# Patient Record
Sex: MALE | Race: WHITE
[De-identification: names, ages, dates, MRNs, and addresses within clinical notes are randomized per-mention and may not be internally consistent; named-entity substitution may affect disease eponyms.]

---

## 2017-08-15 ENCOUNTER — HOSPITAL ENCOUNTER (EMERGENCY)
Dept: HOSPITAL 8 - ED | Age: 52
Discharge: HOME | End: 2017-08-15
Payer: MEDICARE

## 2017-08-15 VITALS — DIASTOLIC BLOOD PRESSURE: 65 MMHG | SYSTOLIC BLOOD PRESSURE: 104 MMHG

## 2017-08-15 VITALS — HEIGHT: 69 IN | WEIGHT: 186.95 LBS | BODY MASS INDEX: 27.69 KG/M2

## 2017-08-15 DIAGNOSIS — K08.89: Primary | ICD-10-CM

## 2017-08-15 PROCEDURE — 99283 EMERGENCY DEPT VISIT LOW MDM: CPT

## 2017-10-10 ENCOUNTER — HOSPITAL ENCOUNTER (EMERGENCY)
Dept: HOSPITAL 8 - ED | Age: 52
Discharge: HOME | End: 2017-10-10
Payer: MEDICARE

## 2017-10-10 VITALS — WEIGHT: 200.62 LBS | HEIGHT: 69 IN | BODY MASS INDEX: 29.71 KG/M2

## 2017-10-10 VITALS — DIASTOLIC BLOOD PRESSURE: 57 MMHG | SYSTOLIC BLOOD PRESSURE: 107 MMHG

## 2017-10-10 DIAGNOSIS — G89.29: ICD-10-CM

## 2017-10-10 DIAGNOSIS — M54.9: ICD-10-CM

## 2017-10-10 DIAGNOSIS — Z90.89: ICD-10-CM

## 2017-10-10 DIAGNOSIS — Z98.890: ICD-10-CM

## 2017-10-10 DIAGNOSIS — K02.9: Primary | ICD-10-CM

## 2017-10-10 PROCEDURE — 99283 EMERGENCY DEPT VISIT LOW MDM: CPT

## 2018-06-20 ENCOUNTER — HOSPITAL ENCOUNTER (EMERGENCY)
Dept: HOSPITAL 8 - ED | Age: 53
Discharge: HOME | End: 2018-06-20
Payer: MEDICARE

## 2018-06-20 VITALS — SYSTOLIC BLOOD PRESSURE: 117 MMHG | DIASTOLIC BLOOD PRESSURE: 75 MMHG

## 2018-06-20 VITALS — BODY MASS INDEX: 31.64 KG/M2 | HEIGHT: 69 IN | WEIGHT: 213.63 LBS

## 2018-06-20 DIAGNOSIS — K02.9: Primary | ICD-10-CM

## 2018-06-20 DIAGNOSIS — G89.29: ICD-10-CM

## 2018-06-20 DIAGNOSIS — F17.210: ICD-10-CM

## 2018-06-20 PROCEDURE — 99283 EMERGENCY DEPT VISIT LOW MDM: CPT

## 2018-09-28 ENCOUNTER — HOSPITAL ENCOUNTER (OUTPATIENT)
Dept: LAB | Facility: MEDICAL CENTER | Age: 53
End: 2018-09-28
Attending: FAMILY MEDICINE
Payer: MEDICARE

## 2018-09-28 LAB
ALBUMIN SERPL BCP-MCNC: 4.2 G/DL (ref 3.2–4.9)
ALBUMIN/GLOB SERPL: 1.5 G/DL
ALP SERPL-CCNC: 39 U/L (ref 30–99)
ALT SERPL-CCNC: 43 U/L (ref 2–50)
ANION GAP SERPL CALC-SCNC: 5 MMOL/L (ref 0–11.9)
AST SERPL-CCNC: 42 U/L (ref 12–45)
BILIRUB SERPL-MCNC: 0.5 MG/DL (ref 0.1–1.5)
BUN SERPL-MCNC: 17 MG/DL (ref 8–22)
CALCIUM SERPL-MCNC: 9.1 MG/DL (ref 8.5–10.5)
CHLORIDE SERPL-SCNC: 105 MMOL/L (ref 96–112)
CHOLEST SERPL-MCNC: 135 MG/DL (ref 100–199)
CO2 SERPL-SCNC: 28 MMOL/L (ref 20–33)
CREAT SERPL-MCNC: 1 MG/DL (ref 0.5–1.4)
ERYTHROCYTE [DISTWIDTH] IN BLOOD BY AUTOMATED COUNT: 50.2 FL (ref 35.9–50)
FASTING STATUS PATIENT QL REPORTED: NORMAL
GLOBULIN SER CALC-MCNC: 2.8 G/DL (ref 1.9–3.5)
GLUCOSE SERPL-MCNC: 80 MG/DL (ref 65–99)
HCT VFR BLD AUTO: 45 % (ref 42–52)
HDLC SERPL-MCNC: 54 MG/DL
HGB BLD-MCNC: 15.4 G/DL (ref 14–18)
LDLC SERPL CALC-MCNC: 63 MG/DL
MCH RBC QN AUTO: 32.4 PG (ref 27–33)
MCHC RBC AUTO-ENTMCNC: 34.2 G/DL (ref 33.7–35.3)
MCV RBC AUTO: 94.5 FL (ref 81.4–97.8)
PLATELET # BLD AUTO: 197 K/UL (ref 164–446)
PMV BLD AUTO: 11.4 FL (ref 9–12.9)
POTASSIUM SERPL-SCNC: 4.4 MMOL/L (ref 3.6–5.5)
PROT SERPL-MCNC: 7 G/DL (ref 6–8.2)
PSA SERPL-MCNC: 0.65 NG/ML (ref 0–4)
RBC # BLD AUTO: 4.76 M/UL (ref 4.7–6.1)
SODIUM SERPL-SCNC: 138 MMOL/L (ref 135–145)
TRIGL SERPL-MCNC: 89 MG/DL (ref 0–149)
WBC # BLD AUTO: 7 K/UL (ref 4.8–10.8)

## 2018-09-28 PROCEDURE — 80053 COMPREHEN METABOLIC PANEL: CPT

## 2018-09-28 PROCEDURE — 36415 COLL VENOUS BLD VENIPUNCTURE: CPT

## 2018-09-28 PROCEDURE — 80061 LIPID PANEL: CPT

## 2018-09-28 PROCEDURE — 84153 ASSAY OF PSA TOTAL: CPT | Mod: GA

## 2018-09-28 PROCEDURE — 85027 COMPLETE CBC AUTOMATED: CPT

## 2018-11-28 ENCOUNTER — HOSPITAL ENCOUNTER (EMERGENCY)
Dept: HOSPITAL 8 - ED | Age: 53
Discharge: HOME | End: 2018-11-28
Payer: MEDICARE

## 2018-11-28 VITALS — DIASTOLIC BLOOD PRESSURE: 71 MMHG | SYSTOLIC BLOOD PRESSURE: 123 MMHG

## 2018-11-28 VITALS — HEIGHT: 69 IN | WEIGHT: 182.32 LBS | BODY MASS INDEX: 27 KG/M2

## 2018-11-28 DIAGNOSIS — L03.011: ICD-10-CM

## 2018-11-28 DIAGNOSIS — Z90.89: ICD-10-CM

## 2018-11-28 DIAGNOSIS — J20.9: Primary | ICD-10-CM

## 2018-11-28 DIAGNOSIS — F17.200: ICD-10-CM

## 2018-11-28 PROCEDURE — 90471 IMMUNIZATION ADMIN: CPT

## 2018-11-28 PROCEDURE — 71046 X-RAY EXAM CHEST 2 VIEWS: CPT

## 2018-11-28 PROCEDURE — 99283 EMERGENCY DEPT VISIT LOW MDM: CPT

## 2018-11-28 PROCEDURE — 90715 TDAP VACCINE 7 YRS/> IM: CPT

## 2019-06-05 ENCOUNTER — HOSPITAL ENCOUNTER (EMERGENCY)
Dept: HOSPITAL 8 - ED | Age: 54
Discharge: HOME | End: 2019-06-05
Payer: MEDICARE

## 2019-06-05 VITALS — DIASTOLIC BLOOD PRESSURE: 80 MMHG | SYSTOLIC BLOOD PRESSURE: 129 MMHG

## 2019-06-05 VITALS — HEIGHT: 69 IN | BODY MASS INDEX: 27.98 KG/M2 | WEIGHT: 188.94 LBS

## 2019-06-05 DIAGNOSIS — K02.9: ICD-10-CM

## 2019-06-05 DIAGNOSIS — K04.7: Primary | ICD-10-CM

## 2019-06-05 PROCEDURE — 99283 EMERGENCY DEPT VISIT LOW MDM: CPT

## 2019-11-20 ENCOUNTER — APPOINTMENT (OUTPATIENT)
Dept: RADIOLOGY | Facility: MEDICAL CENTER | Age: 54
DRG: 381 | End: 2019-11-20
Attending: EMERGENCY MEDICINE
Payer: MEDICARE

## 2019-11-20 ENCOUNTER — HOSPITAL ENCOUNTER (INPATIENT)
Facility: MEDICAL CENTER | Age: 54
LOS: 2 days | DRG: 381 | End: 2019-11-22
Attending: EMERGENCY MEDICINE | Admitting: FAMILY MEDICINE
Payer: MEDICARE

## 2019-11-20 DIAGNOSIS — R11.2 NAUSEA AND VOMITING, INTRACTABILITY OF VOMITING NOT SPECIFIED, UNSPECIFIED VOMITING TYPE: ICD-10-CM

## 2019-11-20 DIAGNOSIS — M54.6 CHRONIC THORACIC BACK PAIN, UNSPECIFIED BACK PAIN LATERALITY: ICD-10-CM

## 2019-11-20 DIAGNOSIS — G89.29 CHRONIC THORACIC BACK PAIN, UNSPECIFIED BACK PAIN LATERALITY: ICD-10-CM

## 2019-11-20 DIAGNOSIS — K92.2 UPPER GI BLEED: ICD-10-CM

## 2019-11-20 LAB
ALBUMIN SERPL BCP-MCNC: 4.6 G/DL (ref 3.2–4.9)
ALBUMIN/GLOB SERPL: 1.8 G/DL
ALP SERPL-CCNC: 61 U/L (ref 30–99)
ALT SERPL-CCNC: 81 U/L (ref 2–50)
ANION GAP SERPL CALC-SCNC: 10 MMOL/L (ref 0–11.9)
APPEARANCE UR: CLEAR
APTT PPP: 30 SEC (ref 24.7–36)
AST SERPL-CCNC: 167 U/L (ref 12–45)
BASOPHILS # BLD AUTO: 0.4 % (ref 0–1.8)
BASOPHILS # BLD: 0.05 K/UL (ref 0–0.12)
BILIRUB SERPL-MCNC: 1 MG/DL (ref 0.1–1.5)
BILIRUB UR QL STRIP.AUTO: NEGATIVE
BUN SERPL-MCNC: 41 MG/DL (ref 8–22)
CALCIUM SERPL-MCNC: 9.2 MG/DL (ref 8.5–10.5)
CHLORIDE SERPL-SCNC: 96 MMOL/L (ref 96–112)
CO2 SERPL-SCNC: 29 MMOL/L (ref 20–33)
COLOR UR: YELLOW
CREAT SERPL-MCNC: 1.08 MG/DL (ref 0.5–1.4)
EOSINOPHIL # BLD AUTO: 0.09 K/UL (ref 0–0.51)
EOSINOPHIL NFR BLD: 0.7 % (ref 0–6.9)
ERYTHROCYTE [DISTWIDTH] IN BLOOD BY AUTOMATED COUNT: 52.2 FL (ref 35.9–50)
GLOBULIN SER CALC-MCNC: 2.6 G/DL (ref 1.9–3.5)
GLUCOSE SERPL-MCNC: 117 MG/DL (ref 65–99)
GLUCOSE UR STRIP.AUTO-MCNC: NEGATIVE MG/DL
HCT VFR BLD AUTO: 40.4 % (ref 42–52)
HGB BLD-MCNC: 13.7 G/DL (ref 14–18)
IMM GRANULOCYTES # BLD AUTO: 0.04 K/UL (ref 0–0.11)
IMM GRANULOCYTES NFR BLD AUTO: 0.3 % (ref 0–0.9)
INR PPP: 0.97 (ref 0.87–1.13)
KETONES UR STRIP.AUTO-MCNC: 15 MG/DL
LACTATE BLD-SCNC: 1.9 MMOL/L (ref 0.5–2)
LEUKOCYTE ESTERASE UR QL STRIP.AUTO: NEGATIVE
LYMPHOCYTES # BLD AUTO: 1.54 K/UL (ref 1–4.8)
LYMPHOCYTES NFR BLD: 12.4 % (ref 22–41)
MCH RBC QN AUTO: 32.4 PG (ref 27–33)
MCHC RBC AUTO-ENTMCNC: 33.9 G/DL (ref 33.7–35.3)
MCV RBC AUTO: 95.5 FL (ref 81.4–97.8)
MICRO URNS: ABNORMAL
MONOCYTES # BLD AUTO: 0.95 K/UL (ref 0–0.85)
MONOCYTES NFR BLD AUTO: 7.7 % (ref 0–13.4)
NEUTROPHILS # BLD AUTO: 9.7 K/UL (ref 1.82–7.42)
NEUTROPHILS NFR BLD: 78.5 % (ref 44–72)
NITRITE UR QL STRIP.AUTO: NEGATIVE
NRBC # BLD AUTO: 0 K/UL
NRBC BLD-RTO: 0 /100 WBC
PH UR STRIP.AUTO: 5.5 [PH] (ref 5–8)
PLATELET # BLD AUTO: 231 K/UL (ref 164–446)
PMV BLD AUTO: 11 FL (ref 9–12.9)
POTASSIUM SERPL-SCNC: 3.9 MMOL/L (ref 3.6–5.5)
PROT SERPL-MCNC: 7.2 G/DL (ref 6–8.2)
PROT UR QL STRIP: NEGATIVE MG/DL
PROTHROMBIN TIME: 13.1 SEC (ref 12–14.6)
RBC # BLD AUTO: 4.23 M/UL (ref 4.7–6.1)
RBC UR QL AUTO: NEGATIVE
SODIUM SERPL-SCNC: 135 MMOL/L (ref 135–145)
SP GR UR STRIP.AUTO: 1.02
UROBILINOGEN UR STRIP.AUTO-MCNC: 0.2 MG/DL
WBC # BLD AUTO: 12.4 K/UL (ref 4.8–10.8)

## 2019-11-20 PROCEDURE — 81003 URINALYSIS AUTO W/O SCOPE: CPT

## 2019-11-20 PROCEDURE — 85730 THROMBOPLASTIN TIME PARTIAL: CPT

## 2019-11-20 PROCEDURE — 85610 PROTHROMBIN TIME: CPT

## 2019-11-20 PROCEDURE — 96376 TX/PRO/DX INJ SAME DRUG ADON: CPT

## 2019-11-20 PROCEDURE — 96374 THER/PROPH/DIAG INJ IV PUSH: CPT

## 2019-11-20 PROCEDURE — 96375 TX/PRO/DX INJ NEW DRUG ADDON: CPT

## 2019-11-20 PROCEDURE — C9113 INJ PANTOPRAZOLE SODIUM, VIA: HCPCS | Performed by: EMERGENCY MEDICINE

## 2019-11-20 PROCEDURE — 85025 COMPLETE CBC W/AUTO DIFF WBC: CPT

## 2019-11-20 PROCEDURE — 770006 HCHG ROOM/CARE - MED/SURG/GYN SEMI*

## 2019-11-20 PROCEDURE — 80053 COMPREHEN METABOLIC PANEL: CPT

## 2019-11-20 PROCEDURE — 87086 URINE CULTURE/COLONY COUNT: CPT

## 2019-11-20 PROCEDURE — 700111 HCHG RX REV CODE 636 W/ 250 OVERRIDE (IP): Performed by: EMERGENCY MEDICINE

## 2019-11-20 PROCEDURE — 83605 ASSAY OF LACTIC ACID: CPT

## 2019-11-20 PROCEDURE — 71045 X-RAY EXAM CHEST 1 VIEW: CPT

## 2019-11-20 PROCEDURE — 87040 BLOOD CULTURE FOR BACTERIA: CPT | Mod: 91

## 2019-11-20 PROCEDURE — 700105 HCHG RX REV CODE 258: Performed by: EMERGENCY MEDICINE

## 2019-11-20 PROCEDURE — 99285 EMERGENCY DEPT VISIT HI MDM: CPT

## 2019-11-20 RX ORDER — BISACODYL 10 MG
10 SUPPOSITORY, RECTAL RECTAL
Status: DISCONTINUED | OUTPATIENT
Start: 2019-11-20 | End: 2019-11-22 | Stop reason: HOSPADM

## 2019-11-20 RX ORDER — ONDANSETRON 2 MG/ML
4 INJECTION INTRAMUSCULAR; INTRAVENOUS EVERY 6 HOURS PRN
Status: DISCONTINUED | OUTPATIENT
Start: 2019-11-20 | End: 2019-11-22 | Stop reason: HOSPADM

## 2019-11-20 RX ORDER — GABAPENTIN 100 MG/1
100 CAPSULE ORAL 2 TIMES DAILY
COMMUNITY
End: 2022-05-09

## 2019-11-20 RX ORDER — NICOTINE 21 MG/24HR
14 PATCH, TRANSDERMAL 24 HOURS TRANSDERMAL
Status: DISCONTINUED | OUTPATIENT
Start: 2019-11-21 | End: 2019-11-22 | Stop reason: HOSPADM

## 2019-11-20 RX ORDER — SODIUM CHLORIDE 9 MG/ML
INJECTION, SOLUTION INTRAVENOUS CONTINUOUS
Status: DISCONTINUED | OUTPATIENT
Start: 2019-11-20 | End: 2019-11-22

## 2019-11-20 RX ORDER — GABAPENTIN 100 MG/1
100 CAPSULE ORAL 2 TIMES DAILY
Status: DISCONTINUED | OUTPATIENT
Start: 2019-11-20 | End: 2019-11-22 | Stop reason: HOSPADM

## 2019-11-20 RX ORDER — AMOXICILLIN 250 MG
2 CAPSULE ORAL 2 TIMES DAILY
Status: DISCONTINUED | OUTPATIENT
Start: 2019-11-21 | End: 2019-11-22 | Stop reason: HOSPADM

## 2019-11-20 RX ORDER — ONDANSETRON 2 MG/ML
4 INJECTION INTRAMUSCULAR; INTRAVENOUS ONCE
Status: COMPLETED | OUTPATIENT
Start: 2019-11-20 | End: 2019-11-20

## 2019-11-20 RX ORDER — TAMSULOSIN HYDROCHLORIDE 0.4 MG/1
0.4 CAPSULE ORAL DAILY
Status: DISCONTINUED | OUTPATIENT
Start: 2019-11-21 | End: 2019-11-22 | Stop reason: HOSPADM

## 2019-11-20 RX ORDER — MORPHINE SULFATE 4 MG/ML
4 INJECTION, SOLUTION INTRAMUSCULAR; INTRAVENOUS ONCE
Status: COMPLETED | OUTPATIENT
Start: 2019-11-20 | End: 2019-11-20

## 2019-11-20 RX ORDER — PANTOPRAZOLE SODIUM 40 MG/1
40 TABLET, DELAYED RELEASE ORAL DAILY
Status: DISCONTINUED | OUTPATIENT
Start: 2019-11-21 | End: 2019-11-20

## 2019-11-20 RX ORDER — PANTOPRAZOLE SODIUM 40 MG/1
40 TABLET, DELAYED RELEASE ORAL DAILY
Refills: 0 | COMMUNITY
Start: 2019-11-02 | End: 2022-05-09

## 2019-11-20 RX ORDER — POLYETHYLENE GLYCOL 3350 17 G/17G
1 POWDER, FOR SOLUTION ORAL
Status: DISCONTINUED | OUTPATIENT
Start: 2019-11-20 | End: 2019-11-22 | Stop reason: HOSPADM

## 2019-11-20 RX ORDER — PANTOPRAZOLE SODIUM 40 MG/10ML
40 INJECTION, POWDER, LYOPHILIZED, FOR SOLUTION INTRAVENOUS 2 TIMES DAILY
Status: DISCONTINUED | OUTPATIENT
Start: 2019-11-21 | End: 2019-11-22 | Stop reason: HOSPADM

## 2019-11-20 RX ADMIN — ONDANSETRON 4 MG: 2 INJECTION INTRAMUSCULAR; INTRAVENOUS at 23:31

## 2019-11-20 RX ADMIN — MORPHINE SULFATE 4 MG: 4 INJECTION INTRAVENOUS at 20:10

## 2019-11-20 RX ADMIN — MORPHINE SULFATE 4 MG: 4 INJECTION INTRAVENOUS at 23:31

## 2019-11-20 RX ADMIN — ONDANSETRON 4 MG: 2 INJECTION INTRAMUSCULAR; INTRAVENOUS at 20:10

## 2019-11-20 RX ADMIN — SODIUM CHLORIDE 80 MG: 9 INJECTION, SOLUTION INTRAVENOUS at 20:39

## 2019-11-21 ENCOUNTER — ANESTHESIA (OUTPATIENT)
Dept: SURGERY | Facility: MEDICAL CENTER | Age: 54
DRG: 381 | End: 2019-11-21
Payer: MEDICARE

## 2019-11-21 ENCOUNTER — ANESTHESIA EVENT (OUTPATIENT)
Dept: SURGERY | Facility: MEDICAL CENTER | Age: 54
DRG: 381 | End: 2019-11-21
Payer: MEDICARE

## 2019-11-21 ENCOUNTER — APPOINTMENT (OUTPATIENT)
Dept: RADIOLOGY | Facility: MEDICAL CENTER | Age: 54
DRG: 381 | End: 2019-11-21
Attending: STUDENT IN AN ORGANIZED HEALTH CARE EDUCATION/TRAINING PROGRAM
Payer: MEDICARE

## 2019-11-21 LAB
ALBUMIN SERPL BCP-MCNC: 3.9 G/DL (ref 3.2–4.9)
ALBUMIN/GLOB SERPL: 1.5 G/DL
ALP SERPL-CCNC: 51 U/L (ref 30–99)
ALT SERPL-CCNC: 72 U/L (ref 2–50)
ANION GAP SERPL CALC-SCNC: 8 MMOL/L (ref 0–11.9)
AST SERPL-CCNC: 148 U/L (ref 12–45)
BILIRUB SERPL-MCNC: 1 MG/DL (ref 0.1–1.5)
BUN SERPL-MCNC: 35 MG/DL (ref 8–22)
CALCIUM SERPL-MCNC: 8.3 MG/DL (ref 8.5–10.5)
CHLORIDE SERPL-SCNC: 101 MMOL/L (ref 96–112)
CO2 SERPL-SCNC: 26 MMOL/L (ref 20–33)
CREAT SERPL-MCNC: 0.86 MG/DL (ref 0.5–1.4)
GLOBULIN SER CALC-MCNC: 2.6 G/DL (ref 1.9–3.5)
GLUCOSE SERPL-MCNC: 97 MG/DL (ref 65–99)
HAV IGM SERPL QL IA: NEGATIVE
HBV CORE IGM SER QL: NEGATIVE
HBV SURFACE AG SER QL: NEGATIVE
HCT VFR BLD AUTO: 39.2 % (ref 42–52)
HCV AB SER QL: NEGATIVE
HGB BLD-MCNC: 13 G/DL (ref 14–18)
LIPASE SERPL-CCNC: 10 U/L (ref 11–82)
POTASSIUM SERPL-SCNC: 3.8 MMOL/L (ref 3.6–5.5)
PROT SERPL-MCNC: 6.5 G/DL (ref 6–8.2)
SODIUM SERPL-SCNC: 135 MMOL/L (ref 135–145)

## 2019-11-21 PROCEDURE — 500066 HCHG BITE BLOCK, ECT: Performed by: INTERNAL MEDICINE

## 2019-11-21 PROCEDURE — A9270 NON-COVERED ITEM OR SERVICE: HCPCS | Performed by: STUDENT IN AN ORGANIZED HEALTH CARE EDUCATION/TRAINING PROGRAM

## 2019-11-21 PROCEDURE — 700102 HCHG RX REV CODE 250 W/ 637 OVERRIDE(OP): Performed by: STUDENT IN AN ORGANIZED HEALTH CARE EDUCATION/TRAINING PROGRAM

## 2019-11-21 PROCEDURE — 160035 HCHG PACU - 1ST 60 MINS PHASE I: Performed by: INTERNAL MEDICINE

## 2019-11-21 PROCEDURE — 80074 ACUTE HEPATITIS PANEL: CPT

## 2019-11-21 PROCEDURE — 160036 HCHG PACU - EA ADDL 30 MINS PHASE I: Performed by: INTERNAL MEDICINE

## 2019-11-21 PROCEDURE — 85018 HEMOGLOBIN: CPT

## 2019-11-21 PROCEDURE — 76700 US EXAM ABDOM COMPLETE: CPT

## 2019-11-21 PROCEDURE — 0DJ68ZZ INSPECTION OF STOMACH, VIA NATURAL OR ARTIFICIAL OPENING ENDOSCOPIC: ICD-10-PCS | Performed by: INTERNAL MEDICINE

## 2019-11-21 PROCEDURE — C9113 INJ PANTOPRAZOLE SODIUM, VIA: HCPCS | Performed by: STUDENT IN AN ORGANIZED HEALTH CARE EDUCATION/TRAINING PROGRAM

## 2019-11-21 PROCEDURE — 700111 HCHG RX REV CODE 636 W/ 250 OVERRIDE (IP): Performed by: ANESTHESIOLOGY

## 2019-11-21 PROCEDURE — 770006 HCHG ROOM/CARE - MED/SURG/GYN SEMI*

## 2019-11-21 PROCEDURE — 80053 COMPREHEN METABOLIC PANEL: CPT

## 2019-11-21 PROCEDURE — 36415 COLL VENOUS BLD VENIPUNCTURE: CPT

## 2019-11-21 PROCEDURE — 700111 HCHG RX REV CODE 636 W/ 250 OVERRIDE (IP): Performed by: STUDENT IN AN ORGANIZED HEALTH CARE EDUCATION/TRAINING PROGRAM

## 2019-11-21 PROCEDURE — 83690 ASSAY OF LIPASE: CPT

## 2019-11-21 PROCEDURE — 160009 HCHG ANES TIME/MIN: Performed by: INTERNAL MEDICINE

## 2019-11-21 PROCEDURE — 160048 HCHG OR STATISTICAL LEVEL 1-5: Performed by: INTERNAL MEDICINE

## 2019-11-21 PROCEDURE — 160203 HCHG ENDO MINUTES - 1ST 30 MINS LEVEL 4: Performed by: INTERNAL MEDICINE

## 2019-11-21 PROCEDURE — 700105 HCHG RX REV CODE 258: Performed by: STUDENT IN AN ORGANIZED HEALTH CARE EDUCATION/TRAINING PROGRAM

## 2019-11-21 PROCEDURE — 0DJ08ZZ INSPECTION OF UPPER INTESTINAL TRACT, VIA NATURAL OR ARTIFICIAL OPENING ENDOSCOPIC: ICD-10-PCS | Performed by: INTERNAL MEDICINE

## 2019-11-21 PROCEDURE — 85014 HEMATOCRIT: CPT

## 2019-11-21 PROCEDURE — 700105 HCHG RX REV CODE 258: Performed by: ANESTHESIOLOGY

## 2019-11-21 PROCEDURE — 160002 HCHG RECOVERY MINUTES (STAT): Performed by: INTERNAL MEDICINE

## 2019-11-21 RX ORDER — SODIUM CHLORIDE, SODIUM LACTATE, POTASSIUM CHLORIDE, CALCIUM CHLORIDE 600; 310; 30; 20 MG/100ML; MG/100ML; MG/100ML; MG/100ML
INJECTION, SOLUTION INTRAVENOUS
Status: DISCONTINUED | OUTPATIENT
Start: 2019-11-21 | End: 2019-11-21 | Stop reason: SURG

## 2019-11-21 RX ORDER — SODIUM CHLORIDE, SODIUM LACTATE, POTASSIUM CHLORIDE, CALCIUM CHLORIDE 600; 310; 30; 20 MG/100ML; MG/100ML; MG/100ML; MG/100ML
INJECTION, SOLUTION INTRAVENOUS CONTINUOUS
Status: DISCONTINUED | OUTPATIENT
Start: 2019-11-21 | End: 2019-11-21 | Stop reason: HOSPADM

## 2019-11-21 RX ORDER — MIDAZOLAM HYDROCHLORIDE 1 MG/ML
INJECTION INTRAMUSCULAR; INTRAVENOUS PRN
Status: DISCONTINUED | OUTPATIENT
Start: 2019-11-21 | End: 2019-11-21 | Stop reason: SURG

## 2019-11-21 RX ORDER — TRAMADOL HYDROCHLORIDE 50 MG/1
100 TABLET ORAL 2 TIMES DAILY PRN
Status: DISCONTINUED | OUTPATIENT
Start: 2019-11-21 | End: 2019-11-22 | Stop reason: HOSPADM

## 2019-11-21 RX ORDER — MORPHINE SULFATE 4 MG/ML
4 INJECTION, SOLUTION INTRAMUSCULAR; INTRAVENOUS ONCE
Status: COMPLETED | OUTPATIENT
Start: 2019-11-21 | End: 2019-11-21

## 2019-11-21 RX ORDER — ONDANSETRON 2 MG/ML
4 INJECTION INTRAMUSCULAR; INTRAVENOUS
Status: DISCONTINUED | OUTPATIENT
Start: 2019-11-21 | End: 2019-11-21 | Stop reason: HOSPADM

## 2019-11-21 RX ORDER — ACETAMINOPHEN 500 MG
1000 TABLET ORAL 3 TIMES DAILY PRN
Status: DISCONTINUED | OUTPATIENT
Start: 2019-11-21 | End: 2019-11-21

## 2019-11-21 RX ADMIN — GABAPENTIN 100 MG: 100 CAPSULE ORAL at 16:48

## 2019-11-21 RX ADMIN — TRAMADOL HYDROCHLORIDE 100 MG: 50 TABLET ORAL at 08:11

## 2019-11-21 RX ADMIN — TAMSULOSIN HYDROCHLORIDE 0.4 MG: 0.4 CAPSULE ORAL at 04:40

## 2019-11-21 RX ADMIN — PANTOPRAZOLE SODIUM 40 MG: 40 INJECTION, POWDER, LYOPHILIZED, FOR SOLUTION INTRAVENOUS at 04:49

## 2019-11-21 RX ADMIN — GABAPENTIN 100 MG: 100 CAPSULE ORAL at 04:40

## 2019-11-21 RX ADMIN — PROPOFOL 40 MG: 10 INJECTION, EMULSION INTRAVENOUS at 13:46

## 2019-11-21 RX ADMIN — NICOTINE POLACRILEX 2 MG: 2 GUM, CHEWING BUCCAL at 01:11

## 2019-11-21 RX ADMIN — NICOTINE 14 MG: 14 PATCH TRANSDERMAL at 04:40

## 2019-11-21 RX ADMIN — PANTOPRAZOLE SODIUM 40 MG: 40 INJECTION, POWDER, LYOPHILIZED, FOR SOLUTION INTRAVENOUS at 16:49

## 2019-11-21 RX ADMIN — SODIUM CHLORIDE, POTASSIUM CHLORIDE, SODIUM LACTATE AND CALCIUM CHLORIDE: 600; 310; 30; 20 INJECTION, SOLUTION INTRAVENOUS at 13:35

## 2019-11-21 RX ADMIN — MORPHINE SULFATE 4 MG: 4 INJECTION INTRAVENOUS at 01:33

## 2019-11-21 RX ADMIN — SODIUM CHLORIDE: 9 INJECTION, SOLUTION INTRAVENOUS at 00:20

## 2019-11-21 RX ADMIN — MIDAZOLAM 2 MG: 1 INJECTION INTRAMUSCULAR; INTRAVENOUS at 13:35

## 2019-11-21 RX ADMIN — PROPOFOL 100 MG: 10 INJECTION, EMULSION INTRAVENOUS at 13:38

## 2019-11-21 RX ADMIN — PROPOFOL 20 MG: 10 INJECTION, EMULSION INTRAVENOUS at 13:42

## 2019-11-21 ASSESSMENT — PATIENT HEALTH QUESTIONNAIRE - PHQ9
SUM OF ALL RESPONSES TO PHQ QUESTIONS 1-9: 4
3. TROUBLE FALLING OR STAYING ASLEEP OR SLEEPING TOO MUCH: SEVERAL DAYS
1. LITTLE INTEREST OR PLEASURE IN DOING THINGS: SEVERAL DAYS
7. TROUBLE CONCENTRATING ON THINGS, SUCH AS READING THE NEWSPAPER OR WATCHING TELEVISION: NOT AT ALL
4. FEELING TIRED OR HAVING LITTLE ENERGY: SEVERAL DAYS
5. POOR APPETITE OR OVEREATING: NOT AT ALL
2. FEELING DOWN, DEPRESSED, IRRITABLE, OR HOPELESS: SEVERAL DAYS
6. FEELING BAD ABOUT YOURSELF - OR THAT YOU ARE A FAILURE OR HAVE LET YOURSELF OR YOUR FAMILY DOWN: NOT AL ALL
9. THOUGHTS THAT YOU WOULD BE BETTER OFF DEAD, OR OF HURTING YOURSELF: NOT AT ALL
SUM OF ALL RESPONSES TO PHQ9 QUESTIONS 1 AND 2: 2
8. MOVING OR SPEAKING SO SLOWLY THAT OTHER PEOPLE COULD HAVE NOTICED. OR THE OPPOSITE, BEING SO FIGETY OR RESTLESS THAT YOU HAVE BEEN MOVING AROUND A LOT MORE THAN USUAL: NOT AT ALL

## 2019-11-21 ASSESSMENT — LIFESTYLE VARIABLES
EVER_SMOKED: YES
AVERAGE NUMBER OF DAYS PER WEEK YOU HAVE A DRINK CONTAINING ALCOHOL: 0
DOES PATIENT WANT TO STOP DRINKING: NO
EVER FELT BAD OR GUILTY ABOUT YOUR DRINKING: NO
HAVE PEOPLE ANNOYED YOU BY CRITICIZING YOUR DRINKING: NO
DOES PATIENT WANT TO STOP DRINKING: NO
HAVE YOU EVER FELT YOU SHOULD CUT DOWN ON YOUR DRINKING: NO
EVER HAD A DRINK FIRST THING IN THE MORNING TO STEADY YOUR NERVES TO GET RID OF A HANGOVER: NO
ALCOHOL_USE: NO
EVER FELT BAD OR GUILTY ABOUT YOUR DRINKING: NO
TOTAL SCORE: 0
CONSUMPTION TOTAL: INCOMPLETE
HOW MANY TIMES IN THE PAST YEAR HAVE YOU HAD 5 OR MORE DRINKS IN A DAY: 0
EVER HAD A DRINK FIRST THING IN THE MORNING TO STEADY YOUR NERVES TO GET RID OF A HANGOVER: NO
ON A TYPICAL DAY WHEN YOU DRINK ALCOHOL HOW MANY DRINKS DO YOU HAVE: 0
HAVE YOU EVER FELT YOU SHOULD CUT DOWN ON YOUR DRINKING: NO
TOTAL SCORE: 0
CONSUMPTION TOTAL: INCOMPLETE
TOTAL SCORE: 0
ALCOHOL_USE: NO

## 2019-11-21 ASSESSMENT — COGNITIVE AND FUNCTIONAL STATUS - GENERAL
SUGGESTED CMS G CODE MODIFIER MOBILITY: CJ
SUGGESTED CMS G CODE MODIFIER DAILY ACTIVITY: CH
DAILY ACTIVITIY SCORE: 24
WALKING IN HOSPITAL ROOM: A LITTLE
MOBILITY SCORE: 22
MOVING FROM LYING ON BACK TO SITTING ON SIDE OF FLAT BED: A LITTLE

## 2019-11-21 ASSESSMENT — COPD QUESTIONNAIRES
IN THE PAST 12 MONTHS DO YOU DO LESS THAN YOU USED TO BECAUSE OF YOUR BREATHING PROBLEMS: DISAGREE/UNSURE
HAVE YOU SMOKED AT LEAST 100 CIGARETTES IN YOUR ENTIRE LIFE: YES
COPD SCREENING SCORE: 3
DO YOU EVER COUGH UP ANY MUCUS OR PHLEGM?: NO/ONLY WITH OCCASIONAL COLDS OR INFECTIONS
DURING THE PAST 4 WEEKS HOW MUCH DID YOU FEEL SHORT OF BREATH: NONE/LITTLE OF THE TIME

## 2019-11-21 ASSESSMENT — PAIN SCALES - GENERAL: PAIN_LEVEL: 0

## 2019-11-21 NOTE — ANESTHESIA QCDR
2019 [facility] Clinical Data Registry (for Quality Improvement)     Postoperative nausea/vomiting risk protocol (Adult = 18 yrs and Pediatric 3-17 yrs)- (430 and 463)  General inhalation anesthetic (NOT TIVA) with PONV risk factors: No  Provision of anti-emetic therapy with at least 2 different classes of agents: N/A  Patient DID NOT receive anti-emetic therapy and reason is documented in Medical Record: N/A    Multimodal Pain Management- (AQI59)  Patient undergoing Elective Surgery (i.e. Outpatient, or ASC, or Prescheduled Surgery prior to Hospital Admission): No  Use of Multimodal Pain Management, two or more drugs and/or interventions, NOT including systemic opioids: N/A  Exception: Documented allergy to multiple classes of analgesics: N/A    PACU assessment of acute postoperative pain prior to Anesthesia Care End- Applies to Patients Age = 18- (ABG7)  Initial PACU pain score is which of the following: < 7/10  Patient unable to report pain score: N/A    Post-anesthetic transfer of care checklist/protocol to PACU/ICU- (426 and 427)  Upon conclusion of case, patient transferred to which of the following locations: PACU/Non-ICU  Use of transfer checklist/protocol: Yes  Exclusion: Service Performed in Patient Hospital Room (and thus did not require transfer): N/A    PACU Reintubation- (AQI31)  General anesthesia requiring endotracheal intubation (ETT) along with subsequent extubation in OR or PACU: No  Required reintubation in the PACU: N/A  Extubation was a planned trial documented in the medical record prior to removal of the original airway device: N/A    Unplanned admission to ICU related to anesthesia service up through end of PACU care- (MD51)  Unplanned admission to ICU (not initially anticipated at anesthesia start time): No

## 2019-11-21 NOTE — ED NOTES
Med rec updated and complete. Allergies reviewed. pt was unable to participate  In an interview at this time. Interviewed family ( wife) at bedside.  Home pharmacy [facility].

## 2019-11-21 NOTE — ANESTHESIA TIME REPORT
Anesthesia Start and Stop Event Times     Date Time Event    11/21/2019 1309 Ready for Procedure     1335 Anesthesia Start     1354 Anesthesia Stop        Responsible Staff  11/21/19    Name Role Begin End    Hilary Coffman M.D. Anesth 1335 1354        Preop Diagnosis (Free Text):  Pre-op Diagnosis     gi bleed        Preop Diagnosis (Codes):    Post op Diagnosis  GI bleed      Premium Reason  Non-Premium    Comments:

## 2019-11-21 NOTE — CONSULTS
Gastroenterology Consult Note:    Anaemily Carrasco  Date & Time note created:    11/21/2019   9:29 AM     Referring MD:  [facility]     Patient ID:   Name:             Karlos Del Toro   YOB: 1965  Age:                 54 y.o.  male   MRN:               [id number]                                                             Reason for Consult:      Nausea  Coffee ground emesis  epigastric Abdominal pain.     History of Present Illness:    The patient is a 54 year old male with PMHx of Chronic back pain, PUD , H pylori s/p treatment 10 years ago presented with symptoms of Nausea, Coffee ground emesis one day prior to the presentation. Patient reports he has symptoms of Nausea with emesis associated with epigastric pain since past 2 days. He has been taking OTC ibuprofen up to 8 pills daily since the past couple of months for chronic low back pain. He has been taking Protonix daily and was previously on Dexilant ( Dexlansaprazole) in the past. He has been having symptoms of PUD since age 18 and was diagnosed with H Pylori twice in the past, most recent episode was 10 years ago. [facility] and was told he has gastritis.   Denies any symptoms of Nausea, vomiting, fever,chills, weight loss, hematochezia, melena since the admission yesterday.  Admits to smoking cigarettes daily 1/2 PPD past 35 years, Denies any alcohol use.     Review of Systems:      Constitutional: Denies fevers, Denies weight changes  Eyes: Denies changes in vision, no eye pain  Ears/Nose/Throat/Mouth: Denies nasal congestion or sore throat   Cardiovascular: Denies chest pain or palpitations.  Respiratory: Denies shortness of breath, cough, and wheezing.  Gastrointestinal/Hepatic: Denies nausea, vomiting   Genitourinary: Denies dysuria or frequency  Musculoskeletal/Rheum: Denies  joint pain and swelling, edema  Skin: Denies rash  Neurological: Denies headache, confusion, memory loss or focal  weakness/parasthesias  Psychiatric: denies mood disorder   Endocrine: Radha thyroid problems  Heme/Oncology/Lymph Nodes: Denies enlarged lymph nodes, denies brusing or known bleeding disorder  All other systems were reviewed and are negative (AMA/CMS criteria)                Past Medical History:   Past Medical History:   Diagnosis Date   • Arthritis    • Back injury    • Backpain     since 1983   • DDD (degenerative disc disease), lumbar    • Depression    • DJD (degenerative joint disease)    • GERD (gastroesophageal reflux disease)    • Pain management contract signed 7/20/2012   • Sleep apnea    • Ulcer          Past Surgical History:  Past Surgical History:   Procedure Laterality Date   • URETEROSCOPY  10/11/2013    Performed by Kimberley Morocho M.D. at SURGERY [facility]   • CYSTOSCOPY STENT PLACEMENT  10/11/2013    Performed by Kimberley Morocho M.D. at SUR[facility]ERY [facility]   • LASERTRIPSY  10/11/2013    Performed by Kimberley Morocho M.D. at SURGERY [facility]   • HERNIA REP HIATAL     • KNEE ARTHROSCOPY      r&L   • TONSILLECTOMY         Hospital Medications:    Current Facility-Administered Medications:   •  tramadol (ULTRAM) 50 MG tablet 100 mg, 100 mg, Oral, BID PRN, Damian Shafer, D.O., 100 mg at 11/21/19 0811  •  tamsulosin (FLOMAX) capsule 0.4 mg, 0.4 mg, Oral, DAILY, Bisi-Dia Draper, D.O., 0.4 mg at 11/21/19 0440  •  senna-docusate (PERICOLACE or SENOKOT S) 8.6-50 MG per tablet 2 Tab, 2 Tab, Oral, BID **AND** polyethylene glycol/lytes (MIRALAX) PACKET 1 Packet, 1 Packet, Oral, QDAY PRN **AND** magnesium hydroxide (MILK OF MAGNESIA) suspension 30 mL, 30 mL, Oral, QDAY PRN **AND** bisacodyl (DULCOLAX) suppository 10 mg, 10 mg, Rectal, QDAY PRN, Bisi-Dia Montes De Ocauson, D.O.  •  NS infusion, , Intravenous, Continuous, Bisi-Dia Montes De Ocauson, D.O., Last Rate: 125 mL/hr at 11/21/19 0105  •  nicotine (NICODERM) 14 MG/24HR 14 mg, 14 mg, Transdermal, Daily-0600, 14 mg at  11/21/19 0440 **AND** Nicotine Replacement Patient Education Materials, , , Once **AND** nicotine polacrilex (NICORETTE) 2 MG piece 2 mg, 2 mg, Oral, Q HOUR PRN, Bisi-Diadavy Montes De Ocauson, D.O., 2 mg at 11/21/19 0111  •  gabapentin (NEURONTIN) capsule 100 mg, 100 mg, Oral, BID, Bisi-Diadavy Montes De Ocauson, D.O., 100 mg at 11/21/19 0440  •  ondansetron (ZOFRAN) syringe/vial injection 4 mg, 4 mg, Intravenous, Q6HRS PRN, Bisi-Dia Bonny, D.O.  •  pantoprazole (PROTONIX) injection 40 mg, 40 mg, Intravenous, BID, Bisi-Diadavy Montes De Ocauson, D.O., 40 mg at 11/21/19 0449    Current Outpatient Medications:  Current Facility-Administered Medications   Medication Dose Route Frequency Provider Last Rate Last Dose   • tramadol (ULTRAM) 50 MG tablet 100 mg  100 mg Oral BID PRN Damian Shafer, D.O.   100 mg at 11/21/19 0811   • tamsulosin (FLOMAX) capsule 0.4 mg  0.4 mg Oral DAILY Bisi-Dia Bonny, D.O.   0.4 mg at 11/21/19 0440   • senna-docusate (PERICOLACE or SENOKOT S) 8.6-50 MG per tablet 2 Tab  2 Tab Oral BID Bisi-Dia Bonny, D.O.        And   • polyethylene glycol/lytes (MIRALAX) PACKET 1 Packet  1 Packet Oral QDAY PRN Bisi-Dia Bonny, D.O.        And   • magnesium hydroxide (MILK OF MAGNESIA) suspension 30 mL  30 mL Oral QDAY PRN Bisi-Dia Bonny, D.O.        And   • bisacodyl (DULCOLAX) suppository 10 mg  10 mg Rectal QDAY PRN Bisi-Dia Bonny, D.O.       • NS infusion   Intravenous Continuous Bisi-Dia Bonny, D.O. 125 mL/hr at 11/21/19 0105     • nicotine (NICODERM) 14 MG/24HR 14 mg  14 mg Transdermal Daily-0600 Bisi-Dia Bonny, D.O.   14 mg at 11/21/19 0440    And   • nicotine polacrilex (NICORETTE) 2 MG piece 2 mg  2 mg Oral Q HOUR PRN Armando Montes De Ocauson, D.O.   2 mg at 11/21/19 0111   • gabapentin (NEURONTIN) capsule 100 mg  100 mg Oral BID Armando Lozadaon, D.O.   100 mg at 11/21/19 0440   • ondansetron (ZOFRAN) syringe/vial injection 4 mg  4 mg Intravenous Q6HRS PRN Armando  Bonny, D.O.       • pantoprazole (PROTONIX) injection 40 mg  40 mg Intravenous BID Armando Bonny, D.O.   40 mg at 19 0449       Medication Allergy:  Allergies   Allergen Reactions   • Latex    • Methadone Vomiting   • Nsaids        Family History:  Family History   Problem Relation Age of Onset   • Genetic Disorder Neg Hx        Social History:  Social History     Socioeconomic History   • Marital status:      Spouse name: Not on file   • Number of children: Not on file   • Years of education: Not on file   • Highest education level: Not on file   Occupational History   • Not on file   Social Needs   • Financial resource strain: Not on file   • Food insecurity:     Worry: Not on file     Inability: Not on file   • Transportation needs:     Medical: Not on file     Non-medical: Not on file   Tobacco Use   • Smoking status: Former Smoker     Packs/day: 0.50     Years: 30.00     Pack years: 15.00     Types: Cigarettes     Last attempt to quit: 2014     Years since quittin.2   • Smokeless tobacco: Never Used   Substance and Sexual Activity   • Alcohol use: No   • Drug use: No   • Sexual activity: Yes     Partners: Female   Lifestyle   • Physical activity:     Days per week: Not on file     Minutes per session: Not on file   • Stress: Not on file   Relationships   • Social connections:     Talks on phone: Not on file     Gets together: Not on file     Attends [employer]ze service: Not on file     Active member of club or organization: Not on file     Attends meetings of clubs or organizations: Not on file     Relationship status: Not on file   • Intimate partner violence:     Fear of current or ex partner: Not on file     Emotionally abused: Not on file     Physically abused: Not on file     Forced sexual activity: Not on file   Other Topics Concern   • Not on file   Social History Narrative   • Not on file         Physical Exam:  Vitals/ General Appearance:   Weight/BMI: Body mass index is  "29.76 kg/m².  /57   Pulse 76   Temp 36.7 °C (98.1 °F) (Temporal)   Resp 16   Ht 1.753 m (5' 9\")   Wt 91.4 kg (201 lb 8 oz)   SpO2 97%   Vitals:    11/21/19 0050 11/21/19 0400 11/21/19 0457 11/21/19 0800   BP: 108/45 111/62  105/57   Pulse: (!) 107 (!) 101  76   Resp: 20 18 16   Temp: 36.3 °C (97.3 °F) 37 °C (98.6 °F)  36.7 °C (98.1 °F)   TempSrc: Temporal Temporal  Temporal   SpO2: 97%  92% 97%   Weight: 91.4 kg (201 lb 8 oz)      Height:         Oxygen Therapy:  Pulse Oximetry: 97 %, O2 (LPM): 0, O2 Delivery: None (Room Air)    Constitutional:   Well developed, Well nourished, No acute distress  HENMT:  Normocephalic, Atraumatic, Oropharynx moist mucous membranes, No oral exudates, Nose normal.  No thyromegaly.  MALLAMPATI Score 2   Eyes:  EOMI, Conjunctiva normal, No discharge.  Neck:  Normal range of motion, No cervical tenderness,  no JVD.  Cardiovascular:  Normal heart rate, Normal rhythm, No murmurs, No rubs, No gallops.   Extremitites with intact distal pulses, no cyanosis, or edema.  Lungs:  Normal breath sounds, breath sounds clear to auscultation bilaterally,  no crackles, no wheezing.   Abdomen: Bowel sounds normal, Soft, Mild tenderness present in the epigastric area, No guarding, No rebound, No masses, No hepatosplenomegaly.  Skin: Warm, Dry, No erythema, No rash, no induration.  Neurologic: Alert & oriented x 3, No focal deficits noted, cranial nerves II through X are grossly intact.  Psychiatric: Affect normal, Judgment normal, Mood normal.    MDM (Data Review):     Records reviewed and summarized in current documentation    Lab Data Review:  Recent Results (from the past 24 hour(s))   Lactic acid (lactate)    Collection Time: 11/20/19  7:58 PM   Result Value Ref Range    Lactic Acid 1.9 0.5 - 2.0 mmol/L   CBC WITH DIFFERENTIAL    Collection Time: 11/20/19  7:58 PM   Result Value Ref Range    WBC 12.4 (H) 4.8 - 10.8 K/uL    RBC 4.23 (L) 4.70 - 6.10 M/uL    Hemoglobin 13.7 (L) 14.0 - 18.0 " g/dL    Hematocrit 40.4 (L) 42.0 - 52.0 %    MCV 95.5 81.4 - 97.8 fL    MCH 32.4 27.0 - 33.0 pg    MCHC 33.9 33.7 - 35.3 g/dL    RDW 52.2 (H) 35.9 - 50.0 fL    Platelet Count 231 164 - 446 K/uL    MPV 11.0 9.0 - 12.9 fL    Neutrophils-Polys 78.50 (H) 44.00 - 72.00 %    Lymphocytes 12.40 (L) 22.00 - 41.00 %    Monocytes 7.70 0.00 - 13.40 %    Eosinophils 0.70 0.00 - 6.90 %    Basophils 0.40 0.00 - 1.80 %    Immature Granulocytes 0.30 0.00 - 0.90 %    Nucleated RBC 0.00 /100 WBC    Neutrophils (Absolute) 9.70 (H) 1.82 - 7.42 K/uL    Lymphs (Absolute) 1.54 1.00 - 4.80 K/uL    Monos (Absolute) 0.95 (H) 0.00 - 0.85 K/uL    Eos (Absolute) 0.09 0.00 - 0.51 K/uL    Baso (Absolute) 0.05 0.00 - 0.12 K/uL    Immature Granulocytes (abs) 0.04 0.00 - 0.11 K/uL    NRBC (Absolute) 0.00 K/uL   COMP METABOLIC PANEL    Collection Time: 11/20/19  7:58 PM   Result Value Ref Range    Sodium 135 135 - 145 mmol/L    Potassium 3.9 3.6 - 5.5 mmol/L    Chloride 96 96 - 112 mmol/L    Co2 29 20 - 33 mmol/L    Anion Gap 10.0 0.0 - 11.9    Glucose 117 (H) 65 - 99 mg/dL    Bun 41 (H) 8 - 22 mg/dL    Creatinine 1.08 0.50 - 1.40 mg/dL    Calcium 9.2 8.5 - 10.5 mg/dL    AST(SGOT) 167 (H) 12 - 45 U/L    ALT(SGPT) 81 (H) 2 - 50 U/L    Alkaline Phosphatase 61 30 - 99 U/L    Total Bilirubin 1.0 0.1 - 1.5 mg/dL    Albumin 4.6 3.2 - 4.9 g/dL    Total Protein 7.2 6.0 - 8.2 g/dL    Globulin 2.6 1.9 - 3.5 g/dL    A-G Ratio 1.8 g/dL   BLOOD CULTURE    Collection Time: 11/20/19  7:58 PM   Result Value Ref Range    Significant Indicator NEG     Source BLD     Site PERIPHERAL     Culture Result       No Growth  Note: Blood cultures are incubated for 5 days and  are monitored continuously.Positive blood cultures  are called to the RN and reported as soon as  they are identified.     BLOOD CULTURE    Collection Time: 11/20/19  7:58 PM   Result Value Ref Range    Significant Indicator NEG     Source BLD     Site PERIPHERAL     Culture Result       No Growth  Note:  Blood cultures are incubated for 5 days and  are monitored continuously.Positive blood cultures  are called to the RN and reported as soon as  they are identified.     ESTIMATED GFR    Collection Time: 11/20/19  7:58 PM   Result Value Ref Range    GFR If African American >60 >60 mL/min/1.73 m 2    GFR If Non African American >60 >60 mL/min/1.73 m 2   PROTHROMBIN TIME (INR)    Collection Time: 11/20/19  7:58 PM   Result Value Ref Range    PT 13.1 12.0 - 14.6 sec    INR 0.97 0.87 - 1.13   APTT    Collection Time: 11/20/19  7:58 PM   Result Value Ref Range    APTT 30.0 24.7 - 36.0 sec   URINALYSIS    Collection Time: 11/20/19 11:05 PM   Result Value Ref Range    Color Yellow     Character Clear     Specific Gravity 1.025 <1.035    Ph 5.5 5.0 - 8.0    Glucose Negative Negative mg/dL    Ketones 15 (A) Negative mg/dL    Protein Negative Negative mg/dL    Bilirubin Negative Negative    Urobilinogen, Urine 0.2 Negative    Nitrite Negative Negative    Leukocyte Esterase Negative Negative    Occult Blood Negative Negative    Micro Urine Req see below    Comp Metabolic Panel (CMP)    Collection Time: 11/21/19  3:46 AM   Result Value Ref Range    Sodium 135 135 - 145 mmol/L    Potassium 3.8 3.6 - 5.5 mmol/L    Chloride 101 96 - 112 mmol/L    Co2 26 20 - 33 mmol/L    Anion Gap 8.0 0.0 - 11.9    Glucose 97 65 - 99 mg/dL    Bun 35 (H) 8 - 22 mg/dL    Creatinine 0.86 0.50 - 1.40 mg/dL    Calcium 8.3 (L) 8.5 - 10.5 mg/dL    AST(SGOT) 148 (H) 12 - 45 U/L    ALT(SGPT) 72 (H) 2 - 50 U/L    Alkaline Phosphatase 51 30 - 99 U/L    Total Bilirubin 1.0 0.1 - 1.5 mg/dL    Albumin 3.9 3.2 - 4.9 g/dL    Total Protein 6.5 6.0 - 8.2 g/dL    Globulin 2.6 1.9 - 3.5 g/dL    A-G Ratio 1.5 g/dL   HEMOGLOBIN AND HEMATOCRIT    Collection Time: 11/21/19  3:46 AM   Result Value Ref Range    Hemoglobin 13.0 (L) 14.0 - 18.0 g/dL    Hematocrit 39.2 (L) 42.0 - 52.0 %   ESTIMATED GFR    Collection Time: 11/21/19  3:46 AM   Result Value Ref Range    GFR If  African American >60 >60 mL/min/1.73 m 2    GFR If Non African American >60 >60 mL/min/1.73 m 2       Imaging/Procedures Review:    US-ABDOMEN COMPLETE SURVEY   Final Result         1.  Cholelithiasis without additional sonographic findings of acute cholecystitis.   2.  Marked common bile duct dilatation, consider distal obstructing stone, stenosis, or ampullary lesion. Could be further evaluated with ERCP or MRCP.   3.  Atherosclerosis         DX-CHEST-PORTABLE (1 VIEW)   Final Result            1.  Hazy linear bilateral lung base density suggests atelectasis.          MDM (Assessment and Plan):     Patient Active Problem List    Diagnosis Date Noted   • Hypomagnesemia 09/19/2014     Priority: Medium   • Hypophosphatemia 09/19/2014     Priority: Medium   • Pneumonia 09/18/2014     Priority: Medium   • Back pain 07/22/2009     Priority: Medium   • Tobacco abuse 09/19/2014     Priority: Low   • Anxiety 06/27/2011     Priority: Low   • Dyslipidemia 02/16/2011     Priority: Low   • Depression 08/20/2010     Priority: Low   • GERD (gastroesophageal reflux disease) 07/22/2009     Priority: Low   • Vitamin D deficiency disease 08/15/2014   • ED (erectile dysfunction) 12/11/2013   • Kidney stone 10/11/2013   • Pain management contract signed 07/20/2012   • Ulcer 08/31/2011   • Hypogonadism male 08/20/2010   • Neck pain 07/22/2009       Impression and Plan:    The patient is a 54 year old male presenting with symptoms of Nausea, Multiple episodes of Coffee ground emesis associated with epigastric abdominal pain. Symptoms are likely secondary to PUD, Gastritis, Penetrating ulcer vs other. Patient will need an Esophagogastroduodenoscopy with sedation for further evaluation.    # Nausea  # Coffee ground emesis  # Anemia, Acute blood loss   # Transaminitis  # Common Bile duct dilatation   # Elevated BUN   # Chronic Low back pain    Plan:  -NPO   -Continue Protonix IV 40 mg BID  -EGD today   -Serial Monitoring of  Hemoglobin/hematocrit  -Avoid NSAID's   -GI will continue to follow      Thank your for the opportunity to assist in the care of your patient.  Please call for any questions or concerns.    Ana Carrasco M.D.

## 2019-11-21 NOTE — PROGRESS NOTES
[facility] FAMILY MEDICINE PROGRESS NOTE     Attending: Dr. Vazquez     Resident: Hollis PGY3    PATIENT: Karlos Del Toro; [id number]; 1965    ID: 54 y.o. male admitted for hematemesis hx PUD    SUBJECTIVE: H/H stable overnight. Epigastric pain provoked w/ drinking coffee this morning. No other questions. Some 5/10 pain from back - chronic - was previously well managed w/ tramadol    OBJECTIVE:     Vitals:    11/21/19 0050 11/21/19 0400 11/21/19 0457 11/21/19 0800   BP: 108/45 111/62  105/57   Pulse: (!) 107 (!) 101  76   Resp: 20 18 16   Temp: 36.3 °C (97.3 °F) 37 °C (98.6 °F)  36.7 °C (98.1 °F)   TempSrc: Temporal Temporal  Temporal   SpO2: 97%  92% 97%   Weight: 91.4 kg (201 lb 8 oz)      Height:           Intake/Output Summary (Last 24 hours) at 11/21/2019 0841  Last data filed at 11/21/2019 0300  Gross per 24 hour   Intake 250 ml   Output --   Net 250 ml       PE:  General: No acute distress, resting comfortably in bed.  HEENT: NC/AT. PERRLA. EOMI. MMM  Cardiovascular: RRR  Respiratory: Symmetrical chest. CTAB  Abdomen: soft, no masses, +BS - epgastrium minimally tender to palpation - nondistended  EXT:  PIÑA, %/5 strength - no lower ext edema    LABS:  Recent Labs     11/20/19 1958 11/21/19 0346   WBC 12.4*  --    RBC 4.23*  --    HEMOGLOBIN 13.7* 13.0*   HEMATOCRIT 40.4* 39.2*   MCV 95.5  --    MCH 32.4  --    RDW 52.2*  --    PLATELETCT 231  --    MPV 11.0  --    NEUTSPOLYS 78.50*  --    LYMPHOCYTES 12.40*  --    MONOCYTES 7.70  --    EOSINOPHILS 0.70  --    BASOPHILS 0.40  --      Recent Labs     11/20/19 1958 11/21/19  0346   SODIUM 135 135   POTASSIUM 3.9 3.8   CHLORIDE 96 101   CO2 29 26   BUN 41* 35*   CREATININE 1.08 0.86   CALCIUM 9.2 8.3*   ALBUMIN 4.6 3.9     Estimated GFR/CRCL = Estimated Creatinine Clearance: 109.7 mL/min (by C-G formula based on SCr of 0.86 mg/dL).  Recent Labs     11/20/19 1958 11/21/19  0346   GLUCOSE 117* 97     Recent Labs     11/20/19 1958 11/21/19 0346   ASTSGOT 167* 148*    ALTSGPT 81* 72*   TBILIRUBIN 1.0 1.0   ALKPHOSPHAT 61 51   GLOBULIN 2.6 2.6   INR 0.97  --              Recent Labs     11/20/19 1958   INR 0.97   APTT 30.0       MICROBIOLOGY: none    IMAGING:   US-ABDOMEN COMPLETE SURVEY   Final Result         1.  Cholelithiasis without additional sonographic findings of acute cholecystitis.   2.  Marked common bile duct dilatation, consider distal obstructing stone, stenosis, or ampullary lesion. Could be further evaluated with ERCP or MRCP.   3.  Atherosclerosis         DX-CHEST-PORTABLE (1 VIEW)   Final Result            1.  Hazy linear bilateral lung base density suggests atelectasis.            MEDS:  Current Facility-Administered Medications   Medication Last Dose   • acetaminophen (TYLENOL) tablet 1,000 mg     • tramadol (ULTRAM) 50 MG tablet 100 mg 100 mg at 11/21/19 0811   • tamsulosin (FLOMAX) capsule 0.4 mg 0.4 mg at 11/21/19 0440   • senna-docusate (PERICOLACE or SENOKOT S) 8.6-50 MG per tablet 2 Tab      And   • polyethylene glycol/lytes (MIRALAX) PACKET 1 Packet      And   • magnesium hydroxide (MILK OF MAGNESIA) suspension 30 mL      And   • bisacodyl (DULCOLAX) suppository 10 mg     • NS infusion     • nicotine (NICODERM) 14 MG/24HR 14 mg 14 mg at 11/21/19 0440    And   • nicotine polacrilex (NICORETTE) 2 MG piece 2 mg 2 mg at 11/21/19 0111   • gabapentin (NEURONTIN) capsule 100 mg 100 mg at 11/21/19 0440   • ondansetron (ZOFRAN) syringe/vial injection 4 mg     • pantoprazole (PROTONIX) injection 40 mg 40 mg at 11/21/19 0449       PROBLEM LIST:  No problems updated.    ASSESSMENT/PLAN:   54 y.o. male admitted for upper GI bleed.     # Upper GI bleed  # Hematemesis  Hx PUD, on chronic PPI therapy with good adherence  High dose NSAID use  H/H at 13.7--> 13 this morning  Hemodnamically stable  Elevated BUN, consistent with acute bleed  S/P 80 mg IV protonix in ED, zofran, morphine    Plan:  - BID protonix  - am H/H  - 4 mg zofran q6 hours PRN N/V  - avoid NSAIDS  -  125 ml/hr NS until resuming PO - then advance diet as tolerated  - [facility] Dr[facility] Levy agreed to come see patient and decide if upper endoscopy is appropriate - await recommendations     # Elevated BUN  Likely due to acute bleed       # Transaminitis  , ALT 81 - pt denies EtOH use  Above last CMP in 2015  No evidence of portal HTN or cirrhosis on US  Has hx of symptomatic cholelithiasis a very long time ago and no ongoing hx suggestive of same  Plan:  Will order hepatitis panel     # Chronic back pain  Pt with back fx and chronic pain after injury at 17 yo  Was on significant amount of narcotics and forced to wean while incarcerated in ED   High dose NSAIDS recently provoked GI problems  transaminitis makes tylenol inappropriate  Had effective control of pain w/ tramadol in senior living  Plan:  - Continue home gabapentin  - tramadol 100 BID PRN pain     # Tobacco use disorder  Nicotine patch PRN     #FEN/GI  - NS @ 125/hr overnight  - NPO today awaiting upper endoscopy       #Core Measures   VTE PPx: SCDs, ambulatory - refuses  Abx: None  Fluids: NS @ 125/hr  Lines and Tube: PIV  Diet: NPO  Code Status: Full  PCP:     Dispo: inpt for management of upper GI bleed

## 2019-11-21 NOTE — NON-PROVIDER
PATIENT ID:    NAME:             Karlos Del Toro  MRN:               [id number]  YOB: 1965     Date of Admission: 11/20/2019      Attending: Dr. George MINOR  Medical Student: Margarita Zuniga MS3    Primary Care Physician:  Izzy Driver M.D.     Subjective:  Karlos Del Toro is a 54 year old male with a history of peptic ulcer disease and GERD who presented with multiple episodes of coffee ground appearing emesis for 12 hours prior to arrival yesterday. He has been taking dexilant for PUD for many years however the patient was incarcerated for 4 months and released 3 weeks ago and during hi incarceration, he was switched from dexilant to protonix and give NSAIDs for chronic back pain.      This morning, he reports nausea has improved and no episodes of emesis this am.      Objective:    Temp:  [36 °C (96.8 °F)-37 °C (98.6 °F)] 37 °C (98.6 °F)  Pulse:  [] 101  Resp:  [10-20] 18  BP: ()/(45-82) 111/62  SpO2:  [91 %-97 %] 92 %       Intake/Output Summary (Last 24 hours) at 11/21/2019 0728  Last data filed at 11/21/2019 0300  Gross per 24 hour   Intake 250 ml   Output --   Net 250 ml       PE:  General: Pt resting in NAD, cooperative   Skin:  Pink, warm and dry.  No obvious rashes  HEENT: NC/AT. PERRL. EOMI.    Lungs:  Symmetrical.  CTAB with no W/R/R.  Good air movement   Cardiovascular:  S1/S2 RRR without M/R/G.  Abdomen:  Abdomen is soft NT/ND. +BS. No masses noted. Possible healing bruise in mid epigastrium   Extremities:   No gross deformities noted. 2+ pulses in all extremities.    CNS:  Muscle tone is normal. Cranial nerves II-XII grossly intact.    Lab Results   Component Value Date/Time    SODIUM 135 11/21/2019 03:46 AM    POTASSIUM 3.8 11/21/2019 03:46 AM    CHLORIDE 101 11/21/2019 03:46 AM    CO2 26 11/21/2019 03:46 AM    GLUCOSE 97 11/21/2019 03:46 AM    BUN 35 (H) 11/21/2019 03:46 AM    CREATININE 0.86 11/21/2019 03:46 AM    CREATININE 1.2 03/24/2009 10:49 AM      Lab Results    Component Value Date/Time    WBC 12.4 (H) 11/20/2019 07:58 PM    RBC 4.23 (L) 11/20/2019 07:58 PM    HEMOGLOBIN 13.0 (L) 11/21/2019 03:46 AM    HEMATOCRIT 39.2 (L) 11/21/2019 03:46 AM    MCV 95.5 11/20/2019 07:58 PM    MCH 32.4 11/20/2019 07:58 PM    MCHC 33.9 11/20/2019 07:58 PM    MPV 11.0 11/20/2019 07:58 PM    NEUTSPOLYS 78.50 (H) 11/20/2019 07:58 PM    LYMPHOCYTES 12.40 (L) 11/20/2019 07:58 PM    MONOCYTES 7.70 11/20/2019 07:58 PM    EOSINOPHILS 0.70 11/20/2019 07:58 PM    BASOPHILS 0.40 11/20/2019 07:58 PM    ANISOCYTOSIS 1+ 09/18/2014 01:34 PM      Lab Results   Component Value Date/Time    PROTHROMBTM 13.1 11/20/2019 07:58 PM    INR 0.97 11/20/2019 07:58 PM      IMAGES:  DX-CHEST-PORTABLE (1 VIEW)   Final Result               1.  Hazy linear bilateral lung base density suggests atelectasis.         Abd US  IMPRESSION:        1.  Cholelithiasis without additional sonographic findings of acute cholecystitis.  2.  Marked common bile duct dilatation, consider distal obstructing stone, stenosis, or ampullary lesion. Could be further evaluated with ERCP or MRCP.  3.  Atherosclerosis    Assessment and Plan:    Karlos Del Toro is a 54 year old male with a history of PUD presenting with coffee ground emesis       #Upper GI bleed  #Hematemesis   Patient has a history of PUD, recently switched PPIs, took NSAIDs, and presents with coffee ground emesis.  This increases suspicion of a Upper GI bleed.    -4mg zofran for nausea control  -125 ml/hour normal saline for fluid replacement   -recommend follow CBC next am to trend H/H    #Elevated BUN  BUN is elevated but Creatinine is WNL.  Ratio is 40.6 and ratios above 30 raise suspicion for upper GI bleeding.  -repeat CMP next AM to trend     #Transaminitis   (down from 167 yesterday) ALT 72 (down from 81 yesterday)  Abdominal US did not show any signs of cirrhosis or portal hypertension  -follow on tomorrows CMP     #Chronic Back pain  -continue home medication for  management  -avoid NSAIDs secondary to likely upper GI bleed  -avoid narcotics secondary to prior difficulties weaning while incarcerated     # Tobacco use disorder  Nicotine patch PRN     #FEN/GI  - NS @ 125/hr overnight  - ADAT    #Dispo  Patient is admitted to family medicine inpatient team  -maintain inpatient status for management of acute upper GI bleed    #Core Measures   VTE PPx: SCDs, ambulatory  Abx: None  Fluids: NS @ 125/hr  Lines and Tube: PIV  Diet: liquids  Code Status: Full    Margarita Zuniga MS3  [facility]

## 2019-11-21 NOTE — PROGRESS NOTES
SIGN OFF NOTE  He was hospitalized with coffee-ground emesis.  Hemoglobin remained stable about 13.    EGD found grade C severe ulcerative esophagitis from 30 cm to 40 cm with multiple areas of heme staining.  Another cause of GI bleeding was not found.      IMPRESSION  Severe ulcerative esophagitis  Treat with a chronic PPI twice daily.    Moderate epigastric pain  This problem is probably due to the ulcerative esophagitis.  If it does not fairly quickly resolved with the PPI treatment, then additional evaluation could be considered.    Elevated liver test  Probably due to alcoholic liver disease.  An outpatient evaluation is indicated.      RECOMMENDATION  1.  Chronic PPI twice daily  2.  Avoid aspirin and NSAIDs.  3.  Abstain from alcohol.  4.  Outpatient evaluation of the elevated liver test.    I will plan on seeing him as needed for now.  Please call me if additional issues develop.  Thank you for this consult.

## 2019-11-21 NOTE — ED PROVIDER NOTES
"ED Provider Note    Scribed for Dr. Josh Lewis M.D. by Shazia Galan. 2019  8:01 PM    Primary care provider: Izzy Driver M.D.  Means of arrival: Walk in  History obtained from: Patient  History limited by: None    CHIEF COMPLAINT  Chief Complaint   Patient presents with   • N/V     coffee ground emesis   • Abdominal Pain     generalized       HPI  Karlos Del Toro is a 54 y.o. male who presents to the Emergency Department for evaluation of abdominal pain onset earlier today. Patient states that he has associated nausea and vomiting, noting that it \"feels like I have the stomach flu\". He adds that his abdomen has a \"bloated\" sensation. He reports that he has been coffee ground emesis all day. No alleviating or aggravating factors noted at this time. Patient denies any fevers or chills. Patient also denies being on blood thinners. He has had a peptic ulcer in the past.     REVIEW OF SYSTEMS  Pertinent positives include abdominal pain, nausea, vomiting. Pertinent negatives include no fevers or chills. As above, all other systems reviewed and are negative.   See HPI for further details.     PAST MEDICAL HISTORY   has a past medical history of Arthritis, Back injury, Backpain, DDD (degenerative disc disease), lumbar, Depression, DJD (degenerative joint disease), GERD (gastroesophageal reflux disease), Pain management contract signed (2012), Sleep apnea, and Ulcer.    SURGICAL HISTORY   has a past surgical history that includes hernia rep hiatal; tonsillectomy; knee arthroscopy; ureteroscopy (10/11/2013); cystoscopy stent placement (10/11/2013); and lasertripsy (10/11/2013).    SOCIAL HISTORY  Social History     Tobacco Use   • Smoking status: Former Smoker     Packs/day: 0.50     Years: 30.00     Pack years: 15.00     Types: Cigarettes     Last attempt to quit: 2014     Years since quittin.2   • Smokeless tobacco: Never Used   Substance Use Topics   • Alcohol use: No   • Drug use: No    " "  Social History     Substance and Sexual Activity   Drug Use No       FAMILY HISTORY  Family History   Problem Relation Age of Onset   • Genetic Disorder Neg Hx        CURRENT MEDICATIONS  Home Medications     Reviewed by Massiel Mcgrath R.N. (Registered Nurse) on 11/20/19 at 2029  Med List Status: Partial   Medication Last Dose Status   amoxicillin-clavulanate (AUGMENTIN) 875-125 MG TABS  Active   buPROPion SR (WELLBUTRIN-SR) 150 MG TB12  Active   Dexlansoprazole (DEXILANT) 60 MG CPDR  Active   famotidine (PEPCID) 40 MG TABS  Active   lorazepam (ATIVAN) 2 MG tablet  Active   Metoclopramide HCl (REGLAN PO)  Active   morphine SR (MS CONTIN) 60 MG TBCR tablet  Active   nicotine (NICODERM) 14 MG/24HR PT24  Active   Ondansetron HCl (ZOFRAN PO)  Active   oxycodone (OXY-IR) 15 MG immediate release tablet  Active   Oxycodone HCl 20 MG Tab  Active   sildenafil citrate (VIAGRA) 100 MG tablet  Active   tamsulosin (FLOMAX) 0.4 MG capsule  Active   triamcinolone acetonide (KENALOG) 0.1 % CREA  Active   zolpidem (AMBIEN) 10 MG TABS  Active                ALLERGIES  Allergies   Allergen Reactions   • Gabapentin    • Latex    • Methadone Vomiting   • Nsaids        PHYSICAL EXAM  VITAL SIGNS: /82   Pulse (!) 120   Temp 36 °C (96.8 °F) (Temporal)   Resp 20   Ht 1.753 m (5' 9\")   Wt 95.5 kg (210 lb 8.6 oz)   SpO2 95%   BMI 31.09 kg/m²     Constitutional: Well developed, Well nourished, mild distress, Non-toxic appearance.   HENT: Normocephalic, Atraumatic, Bilateral external ears normal, Oropharynx moist, No oral exudates.   Eyes: Brownish coloration to his tongue. PERRLA, EOMI, Conjunctiva normal, No discharge.   Neck: No tenderness, Supple, No stridor.   Lymphatic: No lymphadenopathy noted.   Cardiovascular: Normal heart rate, Normal rhythm.   Thorax & Lungs: Clear to auscultation bilaterally, No respiratory distress, No wheezing, No crackles.   Abdomen: Diffuse abdominal tenderness. Soft, No masses, No pulsatile " masses.   Skin: Warm, Dry, No erythema, No rash.   Extremities:, No edema No cyanosis.   Musculoskeletal: No tenderness to palpation or major deformities noted.  Intact distal pulses  Neurologic: Awake, alert. Moves all extremities spontaneously.  Psychiatric: Affect normal, Judgment normal, Mood normal.     LABS  Results for orders placed or performed during the hospital encounter of 11/20/19   Lactic acid (lactate)   Result Value Ref Range    Lactic Acid 1.9 0.5 - 2.0 mmol/L   CBC WITH DIFFERENTIAL   Result Value Ref Range    WBC 12.4 (H) 4.8 - 10.8 K/uL    RBC 4.23 (L) 4.70 - 6.10 M/uL    Hemoglobin 13.7 (L) 14.0 - 18.0 g/dL    Hematocrit 40.4 (L) 42.0 - 52.0 %    MCV 95.5 81.4 - 97.8 fL    MCH 32.4 27.0 - 33.0 pg    MCHC 33.9 33.7 - 35.3 g/dL    RDW 52.2 (H) 35.9 - 50.0 fL    Platelet Count 231 164 - 446 K/uL    MPV 11.0 9.0 - 12.9 fL    Neutrophils-Polys 78.50 (H) 44.00 - 72.00 %    Lymphocytes 12.40 (L) 22.00 - 41.00 %    Monocytes 7.70 0.00 - 13.40 %    Eosinophils 0.70 0.00 - 6.90 %    Basophils 0.40 0.00 - 1.80 %    Immature Granulocytes 0.30 0.00 - 0.90 %    Nucleated RBC 0.00 /100 WBC    Neutrophils (Absolute) 9.70 (H) 1.82 - 7.42 K/uL    Lymphs (Absolute) 1.54 1.00 - 4.80 K/uL    Monos (Absolute) 0.95 (H) 0.00 - 0.85 K/uL    Eos (Absolute) 0.09 0.00 - 0.51 K/uL    Baso (Absolute) 0.05 0.00 - 0.12 K/uL    Immature Granulocytes (abs) 0.04 0.00 - 0.11 K/uL    NRBC (Absolute) 0.00 K/uL   COMP METABOLIC PANEL   Result Value Ref Range    Sodium 135 135 - 145 mmol/L    Potassium 3.9 3.6 - 5.5 mmol/L    Chloride 96 96 - 112 mmol/L    Co2 29 20 - 33 mmol/L    Anion Gap 10.0 0.0 - 11.9    Glucose 117 (H) 65 - 99 mg/dL    Bun 41 (H) 8 - 22 mg/dL    Creatinine 1.08 0.50 - 1.40 mg/dL    Calcium 9.2 8.5 - 10.5 mg/dL    AST(SGOT) 167 (H) 12 - 45 U/L    ALT(SGPT) 81 (H) 2 - 50 U/L    Alkaline Phosphatase 61 30 - 99 U/L    Total Bilirubin 1.0 0.1 - 1.5 mg/dL    Albumin 4.6 3.2 - 4.9 g/dL    Total Protein 7.2 6.0 - 8.2  g/dL    Globulin 2.6 1.9 - 3.5 g/dL    A-G Ratio 1.8 g/dL   ESTIMATED GFR   Result Value Ref Range    GFR If African American >60 >60 mL/min/1.73 m 2    GFR If Non African American >60 >60 mL/min/1.73 m 2   PROTHROMBIN TIME (INR)   Result Value Ref Range    PT 13.1 12.0 - 14.6 sec    INR 0.97 0.87 - 1.13   APTT   Result Value Ref Range    APTT 30.0 24.7 - 36.0 sec     All labs reviewed by me.    RADIOLOGY  DX-CHEST-PORTABLE (1 VIEW)   Final Result            1.  Hazy linear bilateral lung base density suggests atelectasis.        The radiologist's interpretation of all radiological studies have been reviewed by me.    COURSE & MEDICAL DECISION MAKING  Pertinent Labs & Imaging studies reviewed. (See chart for details)    8:01 PM - Patient seen and examined at bedside. Patient will be treated with morphine 4 mg and Zofran 4 mg. Ordered DX chest, lactic acid, CBC with differentials, CMP, UA, urine culture, blood culture to evaluate his symptoms. The differential diagnoses include but are not limited to: Peptic ulcer disease, hematemesis, gastritis.    10:03 PM - Paged Dr. Tolbert.     10:06 PM I discussed the patient's case and the above findings with Dr. Tolbert ([facility]) who states that they're going to hospitalize the patient.       Decision Making:  Patient presenting with a history of peptic ulcer disease with nausea vomiting today with some coffee-ground emesis.  That has not been repeated here his vital signs are normal after initial fluids given, initially tachycardic.  He was treated with morphine Zofran and Protonix he has not had any recurrent vomiting.  He is noted to have a normal hemoglobin at 13.7, but concerning elevation of his BUN consistent with an upper GI hemorrhage although this seems unlikely to be continuing at this point.  I have discussed this case with [facility] family medicine since his primary is covered by them for hospitalist.  I do at this point I do not think requires emergent  GI consultation although with a course this would be necessary for recurrent bleeding    DISPOSITION:  Patient will be hospitalized by Dr. Tolbert ([facility]) in guarded condition.      FINAL IMPRESSION  1. Upper GI bleed          Shazia QUIJANO (Tuan), am scribing for, and in the presence of, Josh Lewis M.D..    Electronically signed by: Shazia Galan (Tuan), 11/20/2019    Josh QUIJANO M.D. personally performed the services described in this documentation, as scribed by Shazia Galan in my presence, and it is both accurate and complete. C.     The note accurately reflects work and decisions made by me.  Josh Lewis  11/20/2019  10:36 PM

## 2019-11-21 NOTE — CONSULTS
Gastroenterology Consult Note:    Armin Levy  Date & Time note created:    11/21/2019   11:12 AM     Referring MD:  Dr. Antionette Pedraza    Patient ID:  Name:             Karlos Del Toro   YOB: 1965  Age:                 54 y.o.  male   MRN:               [id number]                                                             Reason for Consult:      Upper GI bleeding, epigastric pain    History of Present Illness:    Karlos Del Toro is a 54-year-old man who developed peptic ulcer disease at age 18, and was diagnosed and treated for H. pylori twice remotely.  He has chronically been taking Protonix daily.  About 20 years ago he reportedly [facility], but those results are unavailable.       For chronic low back pain he was taking narcotics, but was weaned off of the narcotics earlier this year.  Recently the low back pain was treated with gabapentin and tramadol.  Because of inadequate relief of the pain he started taking ibuprofen, up to 8 tablets daily.     About 11/19, he developed mild continuous epigastric pain without radiation.  Yesterday he had vomiting, producing coffee-ground material about 5 times.  He came to the ER and was hospitalized.  During the hospitalization he has been hemodynamically stable.  He continues to have the same epigastric pain, but has not had additional vomiting or other signs of active GI bleeding.  Initial hemoglobin was 13.7, and declined to 13.0 today.  Initial BUN was 41 with creatinine 1.08, and declined to 35 and 0.86 today.  Lipase was normal at 10.    Assessment:  Mild upper GI bleeding  The bleeding is probably due to peptic ulcer disease, but could be due to gastritis or conceivably a Phoebe-Bullock tear (although his history does not suggest a Phoebe-Bullock tear).  An upper GI tumor is possible but unlikely.  Proceed with an EGD for evaluation and possible therapeutics.    Moderate epigastric pain  This problem is probably due to  peptic ulcer disease.  Await the EGD.  If an ulcer is not found, then consider additional evaluation.    NSAID treatment  Because of his history of peptic ulcer disease, NSAIDs are contraindicated.    Elevated liver test  Liver tests were elevated at -81-1.0 with albumin 3.9 and lipase 10.  Repeat liver tests were improved to -72-1.0 today.  The pattern of liver test elevation suggests alcoholic liver disease.  Discussed this possibility with him.  Outpatient evaluation is indicated.    Plan:   EGD with possible biopsy or therapeutics  Preoperative factors were negative today.  An informed consent discussion was completed today.  Obtain anesthesia assistance for the EGD due to sleep apnea and anticipated adequate response to moderate sedation.  Discussed the possibility of alcohol usage with him.    Labs:          Recent Labs     11/20/19 1958 11/21/19  0346   SODIUM 135 135   POTASSIUM 3.9 3.8   CHLORIDE 96 101   CO2 29 26   BUN 41* 35*   CREATININE 1.08 0.86   CALCIUM 9.2 8.3*     Recent Labs     11/20/19 1958 11/21/19  0346   ALTSGPT 81* 72*   ASTSGOT 167* 148*   ALKPHOSPHAT 61 51   TBILIRUBIN 1.0 1.0   LIPASE  --  10*   GLUCOSE 117* 97     Recent Labs     11/20/19 1958 11/21/19  0346   RBC 4.23*  --    HEMOGLOBIN 13.7* 13.0*   HEMATOCRIT 40.4* 39.2*   PLATELETCT 231  --    PROTHROMBTM 13.1  --    APTT 30.0  --    INR 0.97  --      Recent Labs     11/20/19 1958 11/21/19  0346   WBC 12.4*  --    NEUTSPOLYS 78.50*  --    LYMPHOCYTES 12.40*  --    MONOCYTES 7.70  --    EOSINOPHILS 0.70  --    BASOPHILS 0.40  --    ASTSGOT 167* 148*   ALTSGPT 81* 72*   ALKPHOSPHAT 61 51   TBILIRUBIN 1.0 1.0       Past Medical History:   Past Medical History:   Diagnosis Date   • Arthritis    • Back injury    • Backpain     since 1983   • DDD (degenerative disc disease), lumbar    • Depression    • DJD (degenerative joint disease)    • GERD (gastroesophageal reflux disease)    • Pain management contract signed  7/20/2012   • Sleep apnea    • Ulcer        Past Surgical History:  Past Surgical History:   Procedure Laterality Date   • URETEROSCOPY  10/11/2013    Performed by Kimberley Morocho M.D. at [facility]ERY [facility]   • CYSTOSCOPY STENT PLACEMENT  10/11/2013    Performed by Kimberley Morocho M.D. at [facility]ERY [facility]   • LASERTRIPSY  10/11/2013    Performed by Kimberley Morocho M.D. at [facility]ERY [facility]   • HERNIA REP HIATAL     • KNEE ARTHROSCOPY      r&L   • TONSILLECTOMY         Hospital Medications:  tamsulosin, 0.4 mg, Oral, DAILY  senna-docusate, 2 Tab, Oral, BID  nicotine, 14 mg, Transdermal, Daily-0600  gabapentin, 100 mg, Oral, BID  pantoprazole, 40 mg, Intravenous, BID      PRN medications: tramadol, senna-docusate **AND** polyethylene glycol/lytes **AND** magnesium hydroxide **AND** bisacodyl, nicotine **AND** Nicotine Replacement Patient Education Materials **AND** nicotine polacrilex, ondansetron    Current Outpatient Medications:  Current Facility-Administered Medications   Medication Dose Route Frequency Provider Last Rate Last Dose   • tramadol (ULTRAM) 50 MG tablet 100 mg  100 mg Oral BID PRN Damian Shafer, D.O.   100 mg at 11/21/19 0811   • tamsulosin (FLOMAX) capsule 0.4 mg  0.4 mg Oral DAILY Bisi-Dia Bonny, D.O.   0.4 mg at 11/21/19 0440   • senna-docusate (PERICOLACE or SENOKOT S) 8.6-50 MG per tablet 2 Tab  2 Tab Oral BID Bisi-Dia Bonny, D.O.        And   • polyethylene glycol/lytes (MIRALAX) PACKET 1 Packet  1 Packet Oral QDAY PRN Bisi-Dia Bonny, D.O.        And   • magnesium hydroxide (MILK OF MAGNESIA) suspension 30 mL  30 mL Oral QDAY PRN Bisi-Dia Bonny, D.O.        And   • bisacodyl (DULCOLAX) suppository 10 mg  10 mg Rectal QDAY PRN Bisi-Dia Bonny, D.O.       • NS infusion   Intravenous Continuous Armando Draper D.O. 125 mL/hr at 11/21/19 0105     • nicotine (NICODERM) 14 MG/24HR 14 mg  14 mg Transdermal Daily-0600  Bisi-Dia Bonny, D.O.   14 mg at 19 0440    And   • nicotine polacrilex (NICORETTE) 2 MG piece 2 mg  2 mg Oral Q HOUR PRN Bisi-Dia Bonny, D.O.   2 mg at 19 0111   • gabapentin (NEURONTIN) capsule 100 mg  100 mg Oral BID Bisi-Dia Bonny, D.O.   100 mg at 19 0440   • ondansetron (ZOFRAN) syringe/vial injection 4 mg  4 mg Intravenous Q6HRS PRN Bisi-Dia Bonny, D.O.       • pantoprazole (PROTONIX) injection 40 mg  40 mg Intravenous BID Bisi-Dia Bonny, D.O.   40 mg at 19 0449       Medication Allergy:  Allergies   Allergen Reactions   • Latex    • Methadone Vomiting   • Nsaids        Family History:  Family History   Problem Relation Age of Onset   • Genetic Disorder Neg Hx        Social History:  Social History     Socioeconomic History   • Marital status:      Spouse name: Not on file   • Number of children: Not on file   • Years of education: Not on file   • Highest education level: Not on file   Occupational History   • Not on file   Social Needs   • Financial resource strain: Not on file   • Food insecurity:     Worry: Not on file     Inability: Not on file   • Transportation needs:     Medical: Not on file     Non-medical: Not on file   Tobacco Use   • Smoking status: Former Smoker     Packs/day: 0.50     Years: 30.00     Pack years: 15.00     Types: Cigarettes     Last attempt to quit: 2014     Years since quittin.2   • Smokeless tobacco: Never Used   Substance and Sexual Activity   • Alcohol use: No   • Drug use: No   • Sexual activity: Yes     Partners: Female   Lifestyle   • Physical activity:     Days per week: Not on file     Minutes per session: Not on file   • Stress: Not on file   Relationships   • Social connections:     Talks on phone: Not on file     Gets together: Not on file     Attends Restoration service: Not on file     Active member of club or organization: Not on file     Attends meetings of clubs or organizations: Not on file      "Relationship status: Not on file   • Intimate partner violence:     Fear of current or ex partner: Not on file     Emotionally abused: Not on file     Physically abused: Not on file     Forced sexual activity: Not on file   Other Topics Concern   • Not on file   Social History Narrative   • Not on file       GI/Nutrition:  Orders Placed This Encounter   Procedures   • Diet NPO     Standing Status:   Standing     Number of Occurrences:   1     Order Specific Question:   Restrict to:     Answer:   Sips with Medications [3]       Physical Exam:  Vitals/ General Appearance:   Weight/BMI: Body mass index is 29.76 kg/m².  /57   Pulse 76   Temp 36.7 °C (98.1 °F) (Temporal)   Resp 16   Ht 1.753 m (5' 9\")   Wt 91.4 kg (201 lb 8 oz)   SpO2 97%   Vitals:    11/21/19 0050 11/21/19 0400 11/21/19 0457 11/21/19 0800   BP: 108/45 111/62  105/57   Pulse: (!) 107 (!) 101  76   Resp: 20 18  16   Temp: 36.3 °C (97.3 °F) 37 °C (98.6 °F)  36.7 °C (98.1 °F)   TempSrc: Temporal Temporal  Temporal   SpO2: 97%  92% 97%   Weight: 91.4 kg (201 lb 8 oz)      Height:         Oxygen Therapy:  Pulse Oximetry: 97 %, O2 (LPM): 0, O2 Delivery: None (Room Air)    Physical Exam    MDM (Data Review):     Records reviewed and summarized in current documentation    Review of Systems:      ROS          Problem List:     Patient Active Problem List    Diagnosis Date Noted   • Hypomagnesemia 09/19/2014     Priority: Medium   • Hypophosphatemia 09/19/2014     Priority: Medium   • Pneumonia 09/18/2014     Priority: Medium   • Back pain 07/22/2009     Priority: Medium   • Tobacco abuse 09/19/2014     Priority: Low   • Anxiety 06/27/2011     Priority: Low   • Dyslipidemia 02/16/2011     Priority: Low   • Depression 08/20/2010     Priority: Low   • GERD (gastroesophageal reflux disease) 07/22/2009     Priority: Low   • Vitamin D deficiency disease 08/15/2014   • ED (erectile dysfunction) 12/11/2013   • Kidney stone 10/11/2013   • Pain management " contract signed 07/20/2012   • Ulcer 08/31/2011   • Hypogonadism male 08/20/2010   • Neck pain 07/22/2009         Thank your for the opportunity to assist in the care of your patient.  Please call for any questions or concerns.    Armin Levy M.D.

## 2019-11-21 NOTE — OR SURGEON
Immediate Post OP Note    PreOp Diagnosis: Upper GI bleeding    PostOp Diagnosis: Upper GI bleeding due to the severe ulcerative esophagitis    Procedure(s):  GASTROSCOPY - Wound Class: Clean Contaminated    Surgeon(s):  Armin Levy M.D.    Anesthesiologist/Type of Anesthesia:  Anesthesiologist: Hilary Coffman M.D./DIVYA    Surgical Staff:  Endoscopy Technician: Tosha Rousseau; Ellen Miller R.N.  Sedation/Monitoring Nurse: Celia Lange R.N.    Specimens removed if any:  * No specimens in log *    Estimated Blood Loss: None    Findings: Grade C severe ulcerative esophagitis from 30 cm to 40 cm with multiple areas of heme staining.    Complications: None        11/21/2019 1:54 PM Armin Levy M.D.

## 2019-11-21 NOTE — ED TRIAGE NOTES
.  Chief Complaint   Patient presents with   • N/V     coffee ground emesis   • Abdominal Pain     generalized      Pt ambulate to triage with above complaint. Pt appears pale. Pt C/O N/VD and abdominal pain today, Pt reports vomiting coffee ground emesis since noon.    Pt placed on oxygen in triage for low O2 sat.    Pt reports history of bleeding ulcers, currently taking Protonix.     Pt educated on triage process and returned to [facility], will notify RN if condition worsens.

## 2019-11-21 NOTE — PROGRESS NOTES
2 RN skin check:     Bruising noted to lower BLE.   Heels dry and calloused.   Buttocks pink and blanching.

## 2019-11-21 NOTE — ANESTHESIA PREPROCEDURE EVALUATION
55 yo w/hx of PUD taking NSAIDS who developed epigastric pain w/coffee ground emesis 11/20    Relevant Problems   ANESTHESIA (within normal limits)      GI   (+) GERD (gastroesophageal reflux disease)      Other   (+) Back pain   (+) Tobacco abuse     Denies CAD, CP/SOB, CVA, HTN, DM, LUNG/LIVER/KIDNEY DZ, URI    Physical Exam    Airway   Mallampati: II  TM distance: >3 FB  Neck ROM: full       Cardiovascular   Rhythm: regular  Rate: normal  (-) murmur     Dental       Very poor dentition   Pulmonary   Breath sounds clear to auscultation     Abdominal    Neurological - normal exam                 Anesthesia Plan    ASA 2       Plan - MAC             Induction: intravenous      Pertinent diagnostic labs and testing reviewed    Informed Consent:    Anesthetic plan and risks discussed with patient.

## 2019-11-21 NOTE — ED NOTES
Pt report multiple episodes of coffee ground emesis since 1200 this afternoon. Pt states he has hx of ulcers.

## 2019-11-21 NOTE — CARE PLAN
Problem: Venous Thromboembolism (VTW)/Deep Vein Thrombosis (DVT) Prevention:  Goal: Patient will participate in Venous Thrombosis (VTE)/Deep Vein Thrombosis (DVT)Prevention Measures  Outcome: NOT MET   Pt refusing SCDs, educated about VTE proph. Pt still declined. Verbalized understanding.     Problem: Bowel/Gastric:  Goal: Normal bowel function is maintained or improved  Outcome: PROGRESSING AS EXPECTED     Pt reports diarrhea yesterday, declines stool softener, c/o nausea, zofran PRN.

## 2019-11-21 NOTE — ANESTHESIA POSTPROCEDURE EVALUATION
Patient: Karlos Del Toro    Procedure Summary     Date:  11/21/19 Room / Location:  [facility] 09 / SURGERY [facility]    Anesthesia Start:  1335 Anesthesia Stop:  1354    Procedure:  GASTROSCOPY (N/A Mouth) Diagnosis:  (esophagitis)    Surgeon:  Armin Levy M.D. Responsible Provider:  Hilary Coffman M.D.    Anesthesia Type:  MAC ASA Status:  2          Final Anesthesia Type: MAC  Last vitals  BP   Blood Pressure: (!) 96/54    Temp   36.9 °C (98.5 °F)    Pulse   Pulse: 84   Resp   12    SpO2   94 %      Anesthesia Post Evaluation    Patient location during evaluation: PACU  Patient participation: complete - patient participated  Level of consciousness: awake  Pain score: 0    Airway patency: patent  Anesthetic complications: no  Cardiovascular status: adequate  Respiratory status: acceptable  Hydration status: acceptable    PONV: none           Nurse Pain Score: 6 (NPRS)

## 2019-11-21 NOTE — PROGRESS NOTES
Received call from US, pt reports only eating 1 cracker in ER with glass of water and last meal was breakfast. OK per US to have imaging done. Waiting for transport. Educated pt about full liquid diet, pt receptive.     Pt c/o 6-8/10 abdominal pain, paged Dr. Draper, one time dose of 4 mg IV morphine.

## 2019-11-21 NOTE — H&P
FAMILY MEDICINE HISTORY AND PHYSICAL       PATIENT ID:  NAME:  Karlos Del Toro  MRN:               [id number]  YOB: 1965    Date of Admission: 11/20/2019      Attending: Dr. Kim Dietz MD  Resident: Armando Draper DO (PGY-3)    Primary Care Physician:  Izzy Driver M.D.    CC:  Vomiting     HPI: Karlos Del Toro is a 54 y.o. M with a significant history of PUD at age 18 who presented with multiple episodes of coffee ground emesis starting today.  He states that he was diagnosed with an ulcer around 18 years old and has been taking PPIs since.  He was recently incarcerated for 4 months and released about 3 weeks ago.  During this time his normal home Dexilant was changed to Protonix and he was given numerous NSAIDs for chronic back pain.  He states this am he started developing abdominal pain, bloating, nausea, and multiple episodes of coffee ground emesis but no samreen blood.  He thought he had the stomach flu and has not been able to hold any liquids or fluids down today.  He states that he sees his PCP, Dr. Driver, about 2x per year and states that he has seen [facility] in the past but not for about 20 years.  He denies and fevers/chills, chest pain, SOB, and states his abdominal pain and nausea have improved.  He denies diarrhea/constipation, blood in his stool and admits to chronic urinary incontinence.    ED Course: 4 mg morphine, 4 mg zofran, 80 mg IV protonix given, CBC with H/H 13.7/40.3, CMP with elevated BUN, lactic acid of 1.9, CXR with atelectasis, and UA collected.    REVIEW OF SYSTEMS:   As per HPI               PAST MEDICAL HISTORY:  Past Medical History:   Diagnosis Date   • Arthritis    • Back injury    • Backpain     since 1983   • DDD (degenerative disc disease), lumbar    • Depression    • DJD (degenerative joint disease)    • GERD (gastroesophageal reflux disease)    • Pain management contract signed 7/20/2012   • Sleep apnea    • Ulcer        PAST SURGICAL HISTORY:  Past  Surgical History:   Procedure Laterality Date   • URETEROSCOPY  10/11/2013    Performed by Kimberley Morocho M.D. at SURGERY [facility] ORS   • CYSTOSCOPY STENT PLACEMENT  10/11/2013    Performed by Kimberley Morocho M.D. at SURGERY [facility]   • LASERTRIPSY  10/11/2013    Performed by Kimberley Morocho M.D. at SURGERY [facility]   • HERNIA REP HIATAL     • KNEE ARTHROSCOPY      r&L   • TONSILLECTOMY         FAMILY HISTORY:  Family History   Problem Relation Age of Onset   • Genetic Disorder Neg Hx        SOCIAL HISTORY:   Pt lives in [place] with wife, recent incarceration x 4 months  Smoking 1/2 ppd x 35 years  Etoh use denies  Drug use CBD oil    DIET:   No orders of the defined types were placed in this encounter.      ALLERGIES:  Allergies   Allergen Reactions   • Gabapentin    • Latex    • Methadone Vomiting   • Nsaids        OUTPATIENT MEDICATIONS:  No current facility-administered medications for this encounter.     Current Outpatient Medications:   •  pantoprazole (PROTONIX) 40 MG Tablet Delayed Response, Take 40 mg by mouth every day. TAKE 1 TABLET BY MOUTH TWICE A DAY, Disp: , Rfl: 0  •  tamsulosin (FLOMAX) 0.4 MG capsule, Take 0.4 mg by mouth every day., Disp: , Rfl:     PHYSICAL EXAM:  Vitals:    19 2230 19 2231 19 2241 19 2251   BP:  107/61 121/77 115/69   Pulse: 95 98 (!) 102 98   Resp:   12 (!) 10   Temp:       TempSrc:       SpO2: 94% 96% 96% 94%   Weight:       Height:       , Temp (24hrs), Av °C (96.8 °F), Min:36 °C (96.8 °F), Max:36 °C (96.8 °F)  , Pulse Oximetry: 94 %, O2 (LPM): 4, O2 Delivery: Nasal Cannula    General: Pt resting in NAD, cooperative   Skin:  Pink, warm and dry.  No rashes  HEENT: NC/AT. PERRL. EOMI. No nasal discharge. Oropharynx nonerythematous without exudate/plaques, mucous membranes dry  Neck:  Supple without lymphadenopathy or rigidity. No JVD   Lungs:  Symmetrical.  CTAB with no W/R/R.  Good air movement   Cardiovascular:   "S1/S2 RRR without M/R/G.  Abdomen:  Abdomen is soft NT/ND. +BS. No masses noted.   Extremities:  Full range of motion. No gross deformities noted. 2+ pulses in all extremities. No C/C/E   CNS:  Muscle tone is normal. Cranial nerves II-XII grossly intact.     LAB TESTS:   Recent Labs     11/20/19 1958   WBC 12.4*   RBC 4.23*   HEMOGLOBIN 13.7*   HEMATOCRIT 40.4*   MCV 95.5   MCH 32.4   RDW 52.2*   PLATELETCT 231   MPV 11.0   NEUTSPOLYS 78.50*   LYMPHOCYTES 12.40*   MONOCYTES 7.70   EOSINOPHILS 0.70   BASOPHILS 0.40         Recent Labs     11/20/19 1958   SODIUM 135   POTASSIUM 3.9   CHLORIDE 96   CO2 29   BUN 41*   CREATININE 1.08   CALCIUM 9.2   ALBUMIN 4.6       CULTURES:   Results     Procedure Component Value Units Date/Time    BLOOD CULTURE [[id number]] Collected:  11/20/19 1958    Order Status:  Completed Specimen:  Blood from Peripheral Updated:  11/20/19 2112    Narrative:       Per Hospital Policy: Only change Specimen Src: to \"Line\" if  specified by physician order.    BLOOD CULTURE [[id number]] Collected:  11/20/19 1958    Order Status:  Completed Specimen:  Blood from Peripheral Updated:  11/20/19 2111    Narrative:       Per Hospital Policy: Only change Specimen Src: to \"Line\" if  specified by physician order.    URINALYSIS [[id number]]     Order Status:  Sent Specimen:  Urine, Clean Catch     URINE CULTURE(NEW) [[id number]]     Order Status:  Sent Specimen:  Urine, Clean Catch           IMAGES:  DX-CHEST-PORTABLE (1 VIEW)   Final Result            1.  Hazy linear bilateral lung base density suggests atelectasis.          CONSULTS:   None    ASSESSMENT/PLAN: 54 y.o. male admitted for upper GI bleed.    # Upper GI bleed  # Hematemesis  Hx PUD, on chronic PPI therapy with good adherence  Recent increase in NSAID use while incarcerated for chronic back pain  Has not seen GI for close to 20 years  H/H at 13.7/40 with cessation of hematemesis since presentation to ED  Elevated BUN, consistent with acute " bleed  S/P 80 mg IV protonix in ED, zofran, morphine    Plan:  - continue 80 mg IV protonix tomorrow, then transition to PO  - repeat H/H in 6 hours  - repeat CMP in 6 hours  - 4 mg zofran q6 hours PRN N/V  - will avoid putting PRN dose of morphine in given pt has significant hx of large amounts of chronic pain meds that he was forced to wean from when incarcerated, nursing to call for PRN if really needed  - avoid all NSAIDs  - 125 ml/hr NS overnight as pt appears clinically dehydrated on exam and was tachycardic on admission  - will need follow up with GI, likely as outpatient unless acute bleeding continues or worsens    # Elevated BUN  Likely due to acute bleed    Plan:  - repeat CMP in 6 hours    # Transaminitis  , ALT 81 - pt denies EtOH use  Above last CMP in 2015    Plan:  - repeat CMP in am  - Abd US to evaluate for cirrhosis/portal HTN    # Chronic back pain  Pt with back fx and chronic pain after injury at 17 yo  Was on significant amount of narcotics and forced to wean while incarcerated in ED    Plan:  - Continue home gabapentin  - caution with narcotics    # Tobacco use disorder  Nicotine patch PRN    #FEN/GI  - NS @ 125/hr overnight  - ADAT    #Dispo  - Inpatient for management of acute upper GI bleed    #Core Measures   VTE PPx: SCDs, ambulatory  Abx: None  Fluids: NS @ 125/hr  Lines and Tube: PIV  Diet: liquids  Code Status: Full      Armando Draper DO PGY-3  [facility]   [phone]

## 2019-11-22 VITALS
TEMPERATURE: 97.4 F | DIASTOLIC BLOOD PRESSURE: 42 MMHG | WEIGHT: 201.5 LBS | BODY MASS INDEX: 29.84 KG/M2 | SYSTOLIC BLOOD PRESSURE: 95 MMHG | OXYGEN SATURATION: 99 % | RESPIRATION RATE: 15 BRPM | HEART RATE: 65 BPM | HEIGHT: 69 IN

## 2019-11-22 LAB
HCT VFR BLD AUTO: 36.6 % (ref 42–52)
HGB BLD-MCNC: 12 G/DL (ref 14–18)

## 2019-11-22 PROCEDURE — 700102 HCHG RX REV CODE 250 W/ 637 OVERRIDE(OP): Performed by: STUDENT IN AN ORGANIZED HEALTH CARE EDUCATION/TRAINING PROGRAM

## 2019-11-22 PROCEDURE — 85014 HEMATOCRIT: CPT

## 2019-11-22 PROCEDURE — A9270 NON-COVERED ITEM OR SERVICE: HCPCS | Performed by: STUDENT IN AN ORGANIZED HEALTH CARE EDUCATION/TRAINING PROGRAM

## 2019-11-22 PROCEDURE — C9113 INJ PANTOPRAZOLE SODIUM, VIA: HCPCS | Performed by: STUDENT IN AN ORGANIZED HEALTH CARE EDUCATION/TRAINING PROGRAM

## 2019-11-22 PROCEDURE — 700111 HCHG RX REV CODE 636 W/ 250 OVERRIDE (IP): Performed by: STUDENT IN AN ORGANIZED HEALTH CARE EDUCATION/TRAINING PROGRAM

## 2019-11-22 PROCEDURE — 36415 COLL VENOUS BLD VENIPUNCTURE: CPT

## 2019-11-22 PROCEDURE — 85018 HEMOGLOBIN: CPT

## 2019-11-22 RX ORDER — CYCLOBENZAPRINE HCL 5 MG
5 TABLET ORAL EVERY EVENING
Qty: 30 TAB | Refills: 0 | Status: SHIPPED | OUTPATIENT
Start: 2019-11-22 | End: 2021-08-14

## 2019-11-22 RX ORDER — FERROUS SULFATE 325(65) MG
325 TABLET ORAL 2 TIMES DAILY WITH MEALS
Qty: 30 TAB | Refills: 2 | Status: SHIPPED | OUTPATIENT
Start: 2019-11-22 | End: 2021-08-14

## 2019-11-22 RX ORDER — TRAMADOL HYDROCHLORIDE 50 MG/1
100 TABLET ORAL 2 TIMES DAILY PRN
Qty: 30 TAB | Refills: 0 | Status: SHIPPED | OUTPATIENT
Start: 2019-11-22 | End: 2019-11-27

## 2019-11-22 RX ORDER — NICOTINE 21 MG/24HR
1 PATCH, TRANSDERMAL 24 HOURS TRANSDERMAL EVERY 24 HOURS
Qty: 30 PATCH | Refills: 1 | Status: SHIPPED | OUTPATIENT
Start: 2019-11-22 | End: 2019-12-06

## 2019-11-22 RX ORDER — FERROUS SULFATE 325(65) MG
325 TABLET ORAL 2 TIMES DAILY WITH MEALS
Status: DISCONTINUED | OUTPATIENT
Start: 2019-11-22 | End: 2019-11-22 | Stop reason: HOSPADM

## 2019-11-22 RX ORDER — CYCLOBENZAPRINE HCL 10 MG
5 TABLET ORAL EVERY EVENING
Status: DISCONTINUED | OUTPATIENT
Start: 2019-11-22 | End: 2019-11-22 | Stop reason: HOSPADM

## 2019-11-22 RX ADMIN — PANTOPRAZOLE SODIUM 40 MG: 40 INJECTION, POWDER, LYOPHILIZED, FOR SOLUTION INTRAVENOUS at 05:50

## 2019-11-22 RX ADMIN — TAMSULOSIN HYDROCHLORIDE 0.4 MG: 0.4 CAPSULE ORAL at 05:50

## 2019-11-22 RX ADMIN — FERROUS SULFATE TAB 325 MG (65 MG ELEMENTAL FE) 325 MG: 325 (65 FE) TAB at 11:58

## 2019-11-22 RX ADMIN — NICOTINE 14 MG: 14 PATCH TRANSDERMAL at 05:50

## 2019-11-22 RX ADMIN — GABAPENTIN 100 MG: 100 CAPSULE ORAL at 05:50

## 2019-11-22 RX ADMIN — TRAMADOL HYDROCHLORIDE 100 MG: 50 TABLET ORAL at 05:50

## 2019-11-22 NOTE — PROGRESS NOTES
Discharge instructions reviewed with pt without any further questions. Printed and signed prescription for Tramadol given. Pt aware other prescriptions were sent and filled at his preferred pharmacy. IV was removed with catheter tip intact. Pt's wife at the hospital to pick him up and take him home via private vehicle.

## 2019-11-22 NOTE — DISCHARGE INSTRUCTIONS
Discharge Instructions    Discharged to home by car with relative. Discharged via walking, hospital escort: Yes.  Special equipment needed: Not Applicable    Be sure to schedule a follow-up appointment with your primary care doctor or any specialists as instructed.     Discharge Plan:     Iron pill twice daily with meals  Flexeril as needed in evenings for pain (will make you sleepy - consider cutting it in half)  Avoid Ibuprofen/advil/motrin/aleve/naproxyn  At most you can you tylenol 500 mg once daily for now  Continue acid reducing medication twice daily  Follow up with primary care doctor in 2-3 wks  Walk and move as much as possible        Diet Plan: Discussed  Activity Level: Discussed  Smoking Cessation Offered: Patient Refused  Confirmed Follow up Appointment: Patient to Call and Schedule Appointment  Confirmed Symptoms Management: Discussed  Medication Reconciliation Updated: Yes  Influenza Vaccine Indication: Patient Refuses    I understand that a diet low in cholesterol, fat, and sodium is recommended for good health. Unless I have been given specific instructions below for another diet, I accept this instruction as my diet prescription.   Other diet: regular    Special Instructions: None    · Is patient discharged on Warfarin / Coumadin?   No     Depression / Suicide Risk    As [facility] are [facility], it is important to learn how to keep safe from harming yourself.    Recognize the warning signs:  · Abrupt changes in personality, positive or negative- including increase in energy   · Giving away possessions  · Change in eating patterns- significant weight changes-  positive or negative  · Change in sleeping patterns- unable to sleep or sleeping all the time   · Unwillingness or inability to communicate  · Depression  · Unusual sadness, discouragement and loneliness  · Talk of wanting to die  · Neglect of personal appearance   · Rebelliousness- reckless behavior  · Withdrawal  from people/activities they love  · Confusion- inability to concentrate     If you or a loved one observes any of these behaviors or has concerns about self-harm, here's what you can do:  · Talk about it- your feelings and reasons for harming yourself  · Remove any means that you might use to hurt yourself (examples: pills, rope, extension cords, firearm)  · Get professional help from the community (Mental Health, Substance Abuse, psychological counseling)  · Do not be alone:Call your Safe Contact- someone whom you trust who will be there for you.  · Call your local CRISIS HOTLINE 605-4363 or 443-626-2404  · Call your local Children's Mobile Crisis Response Team [place] (763) 890-9756 or www.[employer]  · Call the toll free National Suicide Prevention Hotlines   · National Suicide Prevention Lifeline 300-219-LMFI (8405)  · National Hope Line Network 800-SUICIDE (866-1394)

## 2019-11-22 NOTE — DISCHARGE PLANNING
Care Transition Team Assessment    The information provided for this assessment was provided by pt and chart review. Pt confirmed the information provided on facesheet was accurate. Pt lives on the first floor of the apartment complex with wife. Pt receives disability benefits. Pt states to be independent with IADL's prior to admit. Pt uses a cane and walker. Pt's insurance covers medications. Pt has Medicare Part D. [facility] on [address]. Pt denies mental health and substance abuse.     No d/c plan in place at the time of this assessment.     Information Source  Orientation : Oriented x 4  Information Given By: Patient  Who is responsible for making decisions for patient? : Patient    Elopement Risk  Legal Hold: No  Ambulatory or Self Mobile in Wheelchair: Yes  Disoriented: No  Psychiatric Symptoms: None  History of Wandering: No  Elopement this Admit: No  Vocalizing Wanting to Leave: No  Displays Behaviors, Body Language Wanting to Leave: No-Not at Risk for Elopement  Elopement Risk: Not at Risk for Elopement    Interdisciplinary Discharge Planning  Patient or legal guardian wants to designate a caregiver (see row info): Yes  Caregiver name: Shaq   Caregiver relationship to patient: Wife     Discharge Preparedness  What is your plan after discharge?: Uncertain - pending medical team collaboration  What are your discharge supports?: Spouse  Prior Functional Level: Independent with Activities of Daily Living, Uses Cane, Uses Walker  Difficulity with IADLs: None    Functional Assesment  Prior Functional Level: Independent with Activities of Daily Living, Uses Cane, Uses Walker    Finances  Financial Barriers to Discharge: No  Prescription Coverage: Yes    Vision / Hearing Impairment  Vision Impairment : No  Hearing Impairment : No    Domestic Abuse  Have you ever been the victim of abuse or violence?: No  Physical Abuse or Sexual Abuse: No  Verbal Abuse or Emotional Abuse: No  Possible Abuse Reported  to:: Not Applicable    Psychological Assessment  History of Substance Abuse: None  History of Psychiatric Problems: No    Discharge Risks or Barriers  Discharge risks or barriers?: No    Anticipated Discharge Information  Anticipated discharge disposition: Discharge needs currently unknown

## 2019-11-22 NOTE — CARE PLAN
Problem: Safety  Goal: Will remain free from injury  Outcome: PROGRESSING AS EXPECTED     Problem: Venous Thromboembolism (VTW)/Deep Vein Thrombosis (DVT) Prevention:  Goal: Patient will participate in Venous Thrombosis (VTE)/Deep Vein Thrombosis (DVT)Prevention Measures  Outcome: PROGRESSING AS EXPECTED     Problem: Knowledge Deficit  Goal: Knowledge of disease process/condition, treatment plan, diagnostic tests, and medications will improve  Outcome: PROGRESSING AS EXPECTED     Problem: Pain Management  Goal: Pain level will decrease to patient's comfort goal  Outcome: PROGRESSING AS EXPECTED

## 2019-11-23 LAB
BACTERIA UR CULT: NORMAL
SIGNIFICANT IND 70042: NORMAL
SITE SITE: NORMAL
SOURCE SOURCE: NORMAL

## 2019-11-23 NOTE — DISCHARGE SUMMARY
[facility] MEDICINE DISCHARGE SUMMARY and Daily Progress Note    PATIENT ID:    Name:             Karlos Del Toro   YOB: 1965  Age:                 54 y.o.  male   MRN:               [id number]  Address:         [address] [place] [address]   [address]  [place] [address]  Phone:            [phone] (home)    ADMISSION DATE: 11/20/2019    DISCHARGE DATE: 11/22/2019    Daily Progress Note  Subjective: patient w/o concerns - tolerating diet - good UOP and nonbloody nonmelenic stool. Ambulating - pain adequately controlled - no concerns. Cleared by GI for d/c home - endoscopy w/ erosive esophagitis    Objective:  VS: hemodynamically stable, temp normal    Physical Exam:  General: adult male, well appearing laying in bed comfortably  HEENT: NCAT  CV: RRR  REsp: CTA bilat  GI: NT/ND, soft, bs+, no cvat  Ext: no edema, skin dry/warm/intact and pulses 2/4 bilat upper ext    Assessment/Plan:   55 yo male w/ upper GI bleed from erosivve esophagitis. H/H stable. Tolerating PO. Will d/c home today w/ PO iron, pain medication for chronic pain, flexeril to help w/ nonopiate pain management and ongoing PPI tx.      DISCHARGE DIAGNOSES:   GERD  Erosive esophagitis  Hx of PUD  Chronic pain  Tobacco abuse d/o    ATTENDING PHYSICIAN: Dr. George MD    SENIOR RESIDENT: Dr. Shafer, PGY3    CONSULTANTS:    Dr. Levy - GI consultant  PROCEDURES:   Upper endoscopy - 11/21    LABS:  Recent Labs     11/20/19 1958 11/21/19 0346 11/22/19  0625   WBC 12.4*  --   --    RBC 4.23*  --   --    HEMOGLOBIN 13.7* 13.0* 12.0*   HEMATOCRIT 40.4* 39.2* 36.6*   MCV 95.5  --   --    MCH 32.4  --   --    RDW 52.2*  --   --    PLATELETCT 231  --   --    MPV 11.0  --   --    NEUTSPOLYS 78.50*  --   --    LYMPHOCYTES 12.40*  --   --    MONOCYTES 7.70  --   --    EOSINOPHILS 0.70  --   --    BASOPHILS 0.40  --   --      Recent Labs     11/20/19 1958 11/21/19  0346   SODIUM 135 135   POTASSIUM 3.9 3.8   CHLORIDE 96 101   CO2 29 26   GLUCOSE 117* 97   BUN 41*  35*     Lab Results   Component Value Date/Time    CHOLSTRLTOT 135 09/28/2018 10:10 AM    LDL 63 09/28/2018 10:10 AM    HDL 54 09/28/2018 10:10 AM    TRIGLYCERIDE 89 09/28/2018 10:10 AM       Lab Results   Component Value Date/Time    TROPONINI 0.02 09/18/2014 01:34 PM    CKMB 1.5 09/18/2014 01:34 PM       Lab Results   Component Value Date/Time    TROPONINI 0.02 09/18/2014 01:34 PM    CKMB 1.5 09/18/2014 01:34 PM         IMAGING:   RUQ US    1.  Cholelithiasis without additional sonographic findings of acute cholecystitis.  2.  Marked common bile duct dilatation, consider distal obstructing stone, stenosis, or ampullary lesion. Could be further evaluated with ERCP or MRCP.  3.  Atherosclerosis     HISTORY OF PRESENT ILLNESS:   Karlos Del Toro is a 54 y.o. M with a significant history of PUD at age 18 who presented with multiple episodes of coffee ground emesis starting today.  He states that he was diagnosed with an ulcer around 18 years old and has been taking PPIs since.  He was recently incarcerated for 4 months and released about 3 weeks ago.  During this time his normal home Dexilant was changed to Protonix and he was given numerous NSAIDs for chronic back pain.  He states this am he started developing abdominal pain, bloating, nausea, and multiple episodes of coffee ground emesis but no samreen blood.  He thought he had the stomach flu and has not been able to hold any liquids or fluids down today.  He states that he sees his PCP, Dr. Driver, about 2x per year and states that he has seen [facility] in the past but not for about 20 years.  He denies and fevers/chills, chest pain, SOB, and states his abdominal pain and nausea have improved.  He denies diarrhea/constipation, blood in his stool and admits to chronic urinary incontinence.    HOSPITAL COURSE:   Upper GI bleed  H/H stable - no acute bleeding  Hemodynamically stable  PPI BID and then the following day w/ upper endoscopy w/ finding of erosive esophagitis. No  further drop in H/H. Went home tolerating PO w/o pain and no further concerns.    Chronic pain  Seemed appropriate to use some tramadol tx bc of intolerance/contraindication for NSAIDS, transaminitis and contraindication for significant acetaminophen. Flexeril added QHS to help with pain. Sunni run and opiate risk assessment and he is deemed high risk but seems unethical to send him out destined to fail since he has no options for pain maangement.       DISCHARGE CONDITION:  Stable    DISPOSITION: Home     DISCHARGE MEDICATIONS:      AlvertoKarlos   Home Medication Instructions ALESSANDRO:[id number]    Printed on:11/23/19 6123   Medication Information                      cyclobenzaprine (FLEXERIL) 5 MG tablet  Take 1 Tab by mouth every evening.             ferrous sulfate 325 (65 Fe) MG tablet  Take 1 Tab by mouth 2 times a day, with meals.             gabapentin (NEURONTIN) 100 MG Cap  Take 100 mg by mouth 2 Times a Day.             nicotine (NICODERM) 14 MG/24HR PATCH 24 HR  Apply 1 Patch to skin as directed every 24 hours for 14 days.             nicotine polacrilex (NICORETTE) 2 MG Gum  Take 1 Each by mouth every 1 hour as needed for Smoking Cessation (For nicotine urge).             pantoprazole (PROTONIX) 40 MG Tablet Delayed Response  Take 40 mg by mouth every day. TAKE 1 TABLET BY MOUTH TWICE A DAY             tamsulosin (FLOMAX) 0.4 MG capsule  Take 0.4 mg by mouth every day.             tramadol (ULTRAM) 50 MG Tab  Take 2 Tabs by mouth 2 times a day as needed for up to 5 days.                 ACTIVITY:  Normal Activity as Tolerated.    DIET: Healthy - avoid acidic, avoid alcohol, advised to quit smoking    DISCHARGE INSTRUCTIONS:    Patient was instructed to return to the ER or contact the primary care physician immediately with any concerns or symptoms including but not limited to  fever, chills, loss of appetite, weight loss, chest pain, shortness of breath, fatigue, nausea, vomiting, diarrhea, bleeding,  the development of any new wounds or lesions or any developing redness, swelling or warmth in any existing wounds or lesions.  Patient understands that failure to do so may indicate worsening of his/her medical condition(s) and result in adverse clinical outcomes including fatality. We have counseled the patient on the importance of compliance and the patient has agreed to proceed with all medical recommendations and follow up plan indicated above. The patient understands that the failure to do so may result in result in adverse clinical outcomes including fatality.     FOLLOW UP:   Follow up with PCP in 2 weeks  Flexeril and tramadol PRN pain  Encouraged to ambulate as much as possible to help w/ pain  Recommended f/u with GI in the next 3-4 wks @ [facility]    CC:   Izzy Driver MD

## 2019-11-25 LAB
BACTERIA BLD CULT: NORMAL
BACTERIA BLD CULT: NORMAL
SIGNIFICANT IND 70042: NORMAL
SIGNIFICANT IND 70042: NORMAL
SITE SITE: NORMAL
SITE SITE: NORMAL
SOURCE SOURCE: NORMAL
SOURCE SOURCE: NORMAL

## 2020-06-01 NOTE — OP REPORT
EGD PROCEDURE NOTE:    Armin Levy  Date & Time note created:    11/21/2019   1:50 PM       Patient ID:  Name:             Karlos Del Toro   YOB: 1965  Age:                 54 y.o.  male   MRN:               [id number]     PROCEDURE: EGD     SURGEON: Armin Levy M.D.     PREPROCEDURE DIAGNOSIS: Upper GI bleeding    POSTPROCEDURE DIAGNOSIS: Upper GI bleeding due to severe ulcerative esophagitis    SPECIMENS   None    FINDINGS   1.  Grade C severe ulcerative esophagitis from 30 cm to 40 cm with numerous areas of heme staining.  No active bleeding.  2.  Normal stomach  3.  Normal duodenum    IMPRESSION   Upper GI bleeding due to severe ulcerative esophagitis    PLAN   1.  Treat with chronic pantoprazole 40 mg twice daily  2.  Avoid aspirin/NSAIDs.  3.  Avoid alcohol    COMPLICATIONS: None    PHYSICAL EXAM    Lungs: Clear to auscultation anteriorly.   Heart: Normal heart sounds. No murmur.   Abdomen: No masses, tenderness or organomegaly.     CONSENT   The procedure was explained to the patient, including the indications, risks, benefits, alternatives and method of performing the procedure. Questions about the procedure were solicited and answered to the patient’s satisfaction, who appeared to understand and agreed to proceed with it.     EGD PROCEDURE   After signed informed consent, the patient was placed in the left lateral decubitus position, and deep sedation was administered by the anesthesiologist.  The flexible videoendoscope was introduced into the oropharynx, and advance through the esophagus and stomach, and into the descending duodenum without difficulty. On withdrawal of the endoscope, the mucosa was carefully inspected, including forward and retroflexed view of the GE junction. The above biopsies, polypectomies or therapeutics were performed. Upon completion, the endoscope was withdrawn. The patient seemed to tolerate the procedure well, and there were no immediate  Venipuncture per rn     complications.       Armin Levy M.D.

## 2021-03-15 DIAGNOSIS — Z23 NEED FOR VACCINATION: ICD-10-CM

## 2021-07-15 ENCOUNTER — HOSPITAL ENCOUNTER (OUTPATIENT)
Dept: LAB | Facility: MEDICAL CENTER | Age: 56
End: 2021-07-15
Attending: FAMILY MEDICINE
Payer: MEDICAID

## 2021-07-15 LAB
ALBUMIN SERPL BCP-MCNC: 4.1 G/DL (ref 3.2–4.9)
ALBUMIN/GLOB SERPL: 1.5 G/DL
ALP SERPL-CCNC: 62 U/L (ref 30–99)
ALT SERPL-CCNC: 20 U/L (ref 2–50)
ANION GAP SERPL CALC-SCNC: 10 MMOL/L (ref 7–16)
AST SERPL-CCNC: 20 U/L (ref 12–45)
BASOPHILS # BLD AUTO: 0.6 % (ref 0–1.8)
BASOPHILS # BLD: 0.05 K/UL (ref 0–0.12)
BILIRUB SERPL-MCNC: 0.2 MG/DL (ref 0.1–1.5)
BUN SERPL-MCNC: 13 MG/DL (ref 8–22)
CALCIUM SERPL-MCNC: 9.2 MG/DL (ref 8.5–10.5)
CHLORIDE SERPL-SCNC: 102 MMOL/L (ref 96–112)
CHOLEST SERPL-MCNC: 155 MG/DL (ref 100–199)
CO2 SERPL-SCNC: 29 MMOL/L (ref 20–33)
CREAT SERPL-MCNC: 0.92 MG/DL (ref 0.5–1.4)
EOSINOPHIL # BLD AUTO: 0.35 K/UL (ref 0–0.51)
EOSINOPHIL NFR BLD: 4.1 % (ref 0–6.9)
ERYTHROCYTE [DISTWIDTH] IN BLOOD BY AUTOMATED COUNT: 52.6 FL (ref 35.9–50)
FASTING STATUS PATIENT QL REPORTED: NORMAL
GLOBULIN SER CALC-MCNC: 2.7 G/DL (ref 1.9–3.5)
GLUCOSE SERPL-MCNC: 91 MG/DL (ref 65–99)
HCT VFR BLD AUTO: 44.7 % (ref 42–52)
HDLC SERPL-MCNC: 59 MG/DL
HGB BLD-MCNC: 14.6 G/DL (ref 14–18)
IMM GRANULOCYTES # BLD AUTO: 0.03 K/UL (ref 0–0.11)
IMM GRANULOCYTES NFR BLD AUTO: 0.4 % (ref 0–0.9)
LDLC SERPL CALC-MCNC: 74 MG/DL
LYMPHOCYTES # BLD AUTO: 3.01 K/UL (ref 1–4.8)
LYMPHOCYTES NFR BLD: 35.6 % (ref 22–41)
MCH RBC QN AUTO: 30.8 PG (ref 27–33)
MCHC RBC AUTO-ENTMCNC: 32.7 G/DL (ref 33.7–35.3)
MCV RBC AUTO: 94.3 FL (ref 81.4–97.8)
MONOCYTES # BLD AUTO: 0.77 K/UL (ref 0–0.85)
MONOCYTES NFR BLD AUTO: 9.1 % (ref 0–13.4)
NEUTROPHILS # BLD AUTO: 4.24 K/UL (ref 1.82–7.42)
NEUTROPHILS NFR BLD: 50.2 % (ref 44–72)
NRBC # BLD AUTO: 0 K/UL
NRBC BLD-RTO: 0 /100 WBC
PLATELET # BLD AUTO: 216 K/UL (ref 164–446)
PMV BLD AUTO: 11.4 FL (ref 9–12.9)
POTASSIUM SERPL-SCNC: 4.2 MMOL/L (ref 3.6–5.5)
PROT SERPL-MCNC: 6.8 G/DL (ref 6–8.2)
PSA SERPL-MCNC: 1.36 NG/ML (ref 0–4)
RBC # BLD AUTO: 4.74 M/UL (ref 4.7–6.1)
SODIUM SERPL-SCNC: 141 MMOL/L (ref 135–145)
TRIGL SERPL-MCNC: 109 MG/DL (ref 0–149)
TSH SERPL DL<=0.005 MIU/L-ACNC: 4.35 UIU/ML (ref 0.38–5.33)
WBC # BLD AUTO: 8.5 K/UL (ref 4.8–10.8)

## 2021-07-15 PROCEDURE — 85025 COMPLETE CBC W/AUTO DIFF WBC: CPT

## 2021-07-15 PROCEDURE — 84443 ASSAY THYROID STIM HORMONE: CPT

## 2021-07-15 PROCEDURE — 80053 COMPREHEN METABOLIC PANEL: CPT

## 2021-07-15 PROCEDURE — 80061 LIPID PANEL: CPT

## 2021-07-15 PROCEDURE — 84153 ASSAY OF PSA TOTAL: CPT

## 2021-07-15 PROCEDURE — 36415 COLL VENOUS BLD VENIPUNCTURE: CPT

## 2021-08-14 ENCOUNTER — HOSPITAL ENCOUNTER (EMERGENCY)
Facility: MEDICAL CENTER | Age: 56
End: 2021-08-14
Attending: EMERGENCY MEDICINE | Admitting: EMERGENCY MEDICINE
Payer: MEDICAID

## 2021-08-14 VITALS
WEIGHT: 201.5 LBS | OXYGEN SATURATION: 98 % | DIASTOLIC BLOOD PRESSURE: 71 MMHG | TEMPERATURE: 98.9 F | RESPIRATION RATE: 16 BRPM | HEART RATE: 85 BPM | BODY MASS INDEX: 29.84 KG/M2 | SYSTOLIC BLOOD PRESSURE: 116 MMHG | HEIGHT: 69 IN

## 2021-08-14 DIAGNOSIS — L03.114 CELLULITIS OF LEFT UPPER EXTREMITY: ICD-10-CM

## 2021-08-14 PROCEDURE — 99282 EMERGENCY DEPT VISIT SF MDM: CPT

## 2021-08-14 RX ORDER — SULFAMETHOXAZOLE AND TRIMETHOPRIM 800; 160 MG/1; MG/1
1 TABLET ORAL 2 TIMES DAILY
Qty: 10 TABLET | Refills: 0 | Status: SHIPPED | OUTPATIENT
Start: 2021-08-14 | End: 2021-08-19

## 2021-08-14 RX ORDER — AMOXICILLIN 500 MG/1
500 CAPSULE ORAL 3 TIMES DAILY
Qty: 15 CAPSULE | Refills: 0 | Status: SHIPPED | OUTPATIENT
Start: 2021-08-14 | End: 2021-08-19

## 2021-08-14 RX ORDER — METHADONE HYDROCHLORIDE 10 MG/ML
INJECTION, SOLUTION INTRAMUSCULAR; INTRAVENOUS; SUBCUTANEOUS EVERY 8 HOURS
Status: SHIPPED | COMMUNITY
End: 2022-05-09

## 2021-08-14 ASSESSMENT — FIBROSIS 4 INDEX: FIB4 SCORE: 1.16

## 2021-08-14 NOTE — ED PROVIDER NOTES
ED Provider Note    CHIEF COMPLAINT  Chief Complaint   Patient presents with   • Bug Bite     c/o possible bite on left AC area of arm. red and warm to touch. reports getting worse. possible bed bug exposure.        HPI  Karlos Del Toro is a 56 y.o. male who presents stating that he noticed a red area of his left antecubital area that is now spreading.  There is been some yellow pus on the surface of the reddened area.  He has had no fever, no vomiting, he denies IV drug abuse.  He thinks maybe was bitten by a bug but did not see any bugs.    REVIEW OF SYSTEMS  See HPI for further details. All other systems are negative.     PAST MEDICAL HISTORY   has a past medical history of Arthritis, Back injury, Backpain, DDD (degenerative disc disease), lumbar, Depression, DJD (degenerative joint disease), GERD (gastroesophageal reflux disease), Pain management contract signed (7/20/2012), Sleep apnea, and Ulcer.    SOCIAL HISTORY  Social History     Tobacco Use   • Smoking status: Current Every Day Smoker     Packs/day: 0.50     Years: 30.00     Pack years: 15.00     Types: Cigarettes   • Smokeless tobacco: Never Used   Vaping Use   • Vaping Use: Never used   Substance and Sexual Activity   • Alcohol use: No   • Drug use: No   • Sexual activity: Yes     Partners: Female       SURGICAL HISTORY   has a past surgical history that includes hernia rep hiatal; tonsillectomy; knee arthroscopy; ureteroscopy (10/11/2013); cystoscopy stent placement (10/11/2013); lasertripsy (10/11/2013); and gastroscopy-endo (N/A, 11/21/2019).    CURRENT MEDICATIONS  Home Medications     Reviewed by Ebony Morillo R.N. (Registered Nurse) on 08/14/21 at 0920  Med List Status: Not Addressed   Medication Last Dose Status   gabapentin (NEURONTIN) 100 MG Cap  Active   methadone (DOLOPHINE) 10 MG/ML injection  Active   pantoprazole (PROTONIX) 40 MG Tablet Delayed Response  Active   tamsulosin (FLOMAX) 0.4 MG capsule  Active           "      ALLERGIES  Allergies   Allergen Reactions   • Nsaids        PHYSICAL EXAM  VITAL SIGNS: /71   Pulse 85   Temp 37.2 °C (98.9 °F) (Temporal)   Resp 16   Ht 1.753 m (5' 9\")   Wt 91.4 kg (201 lb 8 oz)   SpO2 98%   BMI 29.76 kg/m²  @CLARICE[[id number]::@   Pulse ox interpretation: I interpret this pulse ox as normal.  Constitutional: Alert in no apparent distress.  HENT: No signs of trauma, Bilateral external ears normal, Nose normal.   Eyes: Pupils are equal and reactive, Conjunctiva normal, Non-icteric.   Neck: Normal range of motion, No tenderness, Supple, No stridor.   Lymphatic: No lymphadenopathy noted.   Cardiovascular: Regular rate and rhythm, no murmurs.   Thorax & Lungs: Normal breath sounds, No respiratory distress, No wheezing, No chest tenderness.   Abdomen: Bowel sounds normal, Soft, No tenderness, No masses, No pulsatile masses. No peritoneal signs.  Skin: Warm, Dry, the patient has an erythematous area of the left antecubital area.  There is no evidence of abscess.  Extremities: Intact distal pulses, No edema, No tenderness, No cyanosis.  Musculoskeletal: Good range of motion in all major joints. No tenderness to palpation or major deformities noted.   Neurologic: Alert , Normal motor function, Normal sensory function, No focal deficits noted.   Psychiatric: Affect normal, Judgment normal, Mood normal.           COURSE & MEDICAL DECISION MAKING  Pertinent Labs & Imaging studies reviewed. (See chart for details)    The patient presents with what appears to be cellulitis of the left antecubital area.  He will be treated with Bactrim and amoxicillin, he will return for worsening symptoms.  There is no evidence of abscess that can be drained.     The patient will return for new or worsening symptoms and is stable at the time of discharge.    The patient is referred to a primary physician for blood pressure management, diabetic screening, and for all other preventative health " concerns.        DISPOSITION:  Patient will be discharged home in stable condition.    FOLLOW UP:  [facility], Emergency Dept  [address]  [place] [id number] [phone]    If symptoms worsen    Izzy Driver M.D.  [address] [place] [address]  [phone]      As needed      OUTPATIENT MEDICATIONS:  New Prescriptions    AMOXICILLIN (AMOXIL) 500 MG CAP    Take 1 Capsule by mouth 3 times a day for 5 days.    SULFAMETHOXAZOLE-TRIMETHOPRIM (BACTRIM DS) 800-160 MG TABLET    Take 1 Tablet by mouth 2 times a day for 5 days.          The patient will return for worsening symptoms and is stable at the time of discharge. The patient verbalizes understanding and will comply.    FINAL IMPRESSION  1. Cellulitis of left upper extremity                Electronically signed by: Dwayne Abbott M.D., 8/14/2021 10:00 AM

## 2021-08-14 NOTE — ED TRIAGE NOTES
".  Chief Complaint   Patient presents with   • Bug Bite     c/o possible bite on left AC area of arm. red and warm to touch. reports getting worse. possible bed bug exposure.        ./71   Pulse 85   Temp 37.2 °C (98.9 °F) (Temporal)   Resp 16   Ht 1.753 m (5' 9\")   Wt 91.4 kg (201 lb 8 oz)   SpO2 98%        Explained triage process, to waiting room. Asked to inform RN if questions or concerns arise.   "

## 2021-10-18 ENCOUNTER — HOSPITAL ENCOUNTER (EMERGENCY)
Facility: MEDICAL CENTER | Age: 56
End: 2021-10-18
Payer: MEDICAID

## 2021-10-18 VITALS
HEIGHT: 69 IN | BODY MASS INDEX: 29.03 KG/M2 | DIASTOLIC BLOOD PRESSURE: 78 MMHG | TEMPERATURE: 96.7 F | HEART RATE: 83 BPM | RESPIRATION RATE: 18 BRPM | WEIGHT: 195.99 LBS | SYSTOLIC BLOOD PRESSURE: 130 MMHG | OXYGEN SATURATION: 93 %

## 2021-10-18 PROCEDURE — 302449 STATCHG TRIAGE ONLY (STATISTIC)

## 2021-10-18 ASSESSMENT — FIBROSIS 4 INDEX: FIB4 SCORE: 1.16

## 2021-10-18 NOTE — ED TRIAGE NOTES
"55 y/o male ambulatory to triage with   Chief Complaint   Patient presents with   • Neck Pain     began a couple of weeks ago, states neck is stiff and feels \"kinked\", has been using hot and cold compresses without relief.      /78   Pulse 83   Temp 35.9 °C (96.7 °F) (Temporal)   Resp 18   Ht 1.753 m (5' 9\")   Wt 88.9 kg (195 lb 15.8 oz)   SpO2 93%   BMI 28.94 kg/m²     "

## 2022-05-09 ENCOUNTER — ANESTHESIA EVENT (OUTPATIENT)
Dept: SURGERY | Facility: MEDICAL CENTER | Age: 57
DRG: 419 | End: 2022-05-09
Payer: MEDICARE

## 2022-05-09 ENCOUNTER — APPOINTMENT (OUTPATIENT)
Dept: RADIOLOGY | Facility: MEDICAL CENTER | Age: 57
DRG: 419 | End: 2022-05-09
Attending: EMERGENCY MEDICINE
Payer: MEDICARE

## 2022-05-09 ENCOUNTER — ANESTHESIA (OUTPATIENT)
Dept: SURGERY | Facility: MEDICAL CENTER | Age: 57
DRG: 419 | End: 2022-05-09
Payer: MEDICARE

## 2022-05-09 ENCOUNTER — HOSPITAL ENCOUNTER (INPATIENT)
Facility: MEDICAL CENTER | Age: 57
LOS: 1 days | DRG: 419 | End: 2022-05-10
Attending: EMERGENCY MEDICINE | Admitting: SURGERY
Payer: MEDICARE

## 2022-05-09 ENCOUNTER — APPOINTMENT (OUTPATIENT)
Dept: RADIOLOGY | Facility: MEDICAL CENTER | Age: 57
DRG: 419 | End: 2022-05-09
Attending: SURGERY
Payer: MEDICARE

## 2022-05-09 DIAGNOSIS — K81.9 CHOLECYSTITIS: ICD-10-CM

## 2022-05-09 PROBLEM — K81.0 ACUTE CHOLECYSTITIS: Status: ACTIVE | Noted: 2022-05-09

## 2022-05-09 LAB
ALBUMIN SERPL BCP-MCNC: 4.2 G/DL (ref 3.2–4.9)
ALBUMIN/GLOB SERPL: 1.4 G/DL
ALP SERPL-CCNC: 71 U/L (ref 30–99)
ALT SERPL-CCNC: 29 U/L (ref 2–50)
ANION GAP SERPL CALC-SCNC: 9 MMOL/L (ref 7–16)
AST SERPL-CCNC: 29 U/L (ref 12–45)
BASOPHILS # BLD AUTO: 0.3 % (ref 0–1.8)
BASOPHILS # BLD: 0.05 K/UL (ref 0–0.12)
BILIRUB SERPL-MCNC: 0.3 MG/DL (ref 0.1–1.5)
BUN SERPL-MCNC: 13 MG/DL (ref 8–22)
CALCIUM SERPL-MCNC: 9.6 MG/DL (ref 8.5–10.5)
CHLORIDE SERPL-SCNC: 101 MMOL/L (ref 96–112)
CO2 SERPL-SCNC: 27 MMOL/L (ref 20–33)
CREAT SERPL-MCNC: 0.89 MG/DL (ref 0.5–1.4)
EKG IMPRESSION: NORMAL
EOSINOPHIL # BLD AUTO: 0.08 K/UL (ref 0–0.51)
EOSINOPHIL NFR BLD: 0.5 % (ref 0–6.9)
ERYTHROCYTE [DISTWIDTH] IN BLOOD BY AUTOMATED COUNT: 49.6 FL (ref 35.9–50)
GFR SERPLBLD CREATININE-BSD FMLA CKD-EPI: 100 ML/MIN/1.73 M 2
GLOBULIN SER CALC-MCNC: 3.1 G/DL (ref 1.9–3.5)
GLUCOSE SERPL-MCNC: 99 MG/DL (ref 65–99)
HCT VFR BLD AUTO: 46 % (ref 42–52)
HGB BLD-MCNC: 15.3 G/DL (ref 14–18)
IMM GRANULOCYTES # BLD AUTO: 0.07 K/UL (ref 0–0.11)
IMM GRANULOCYTES NFR BLD AUTO: 0.4 % (ref 0–0.9)
LACTATE BLD-SCNC: 1.1 MMOL/L (ref 0.5–2)
LIPASE SERPL-CCNC: 9 U/L (ref 11–82)
LYMPHOCYTES # BLD AUTO: 1.52 K/UL (ref 1–4.8)
LYMPHOCYTES NFR BLD: 9.4 % (ref 22–41)
MCH RBC QN AUTO: 30.6 PG (ref 27–33)
MCHC RBC AUTO-ENTMCNC: 33.3 G/DL (ref 33.7–35.3)
MCV RBC AUTO: 92 FL (ref 81.4–97.8)
MONOCYTES # BLD AUTO: 0.86 K/UL (ref 0–0.85)
MONOCYTES NFR BLD AUTO: 5.3 % (ref 0–13.4)
NEUTROPHILS # BLD AUTO: 13.53 K/UL (ref 1.82–7.42)
NEUTROPHILS NFR BLD: 84.1 % (ref 44–72)
NRBC # BLD AUTO: 0 K/UL
NRBC BLD-RTO: 0 /100 WBC
PLATELET # BLD AUTO: 247 K/UL (ref 164–446)
PMV BLD AUTO: 10.7 FL (ref 9–12.9)
POTASSIUM SERPL-SCNC: 4.6 MMOL/L (ref 3.6–5.5)
PROT SERPL-MCNC: 7.3 G/DL (ref 6–8.2)
RBC # BLD AUTO: 5 M/UL (ref 4.7–6.1)
SODIUM SERPL-SCNC: 137 MMOL/L (ref 135–145)
TROPONIN T SERPL-MCNC: 27 NG/L (ref 6–19)
WBC # BLD AUTO: 16.1 K/UL (ref 4.8–10.8)

## 2022-05-09 PROCEDURE — 160029 HCHG SURGERY MINUTES - 1ST 30 MINS LEVEL 4: Performed by: SURGERY

## 2022-05-09 PROCEDURE — 0FT44ZZ RESECTION OF GALLBLADDER, PERCUTANEOUS ENDOSCOPIC APPROACH: ICD-10-PCS | Performed by: SURGERY

## 2022-05-09 PROCEDURE — A9270 NON-COVERED ITEM OR SERVICE: HCPCS | Performed by: SURGERY

## 2022-05-09 PROCEDURE — 160002 HCHG RECOVERY MINUTES (STAT): Performed by: SURGERY

## 2022-05-09 PROCEDURE — 160035 HCHG PACU - 1ST 60 MINS PHASE I: Performed by: SURGERY

## 2022-05-09 PROCEDURE — 93005 ELECTROCARDIOGRAM TRACING: CPT | Performed by: EMERGENCY MEDICINE

## 2022-05-09 PROCEDURE — 500256 HCHG CATH, REDDICK: Performed by: SURGERY

## 2022-05-09 PROCEDURE — 160041 HCHG SURGERY MINUTES - EA ADDL 1 MIN LEVEL 4: Performed by: SURGERY

## 2022-05-09 PROCEDURE — 96365 THER/PROPH/DIAG IV INF INIT: CPT | Mod: XU

## 2022-05-09 PROCEDURE — 36415 COLL VENOUS BLD VENIPUNCTURE: CPT

## 2022-05-09 PROCEDURE — 160048 HCHG OR STATISTICAL LEVEL 1-5: Performed by: SURGERY

## 2022-05-09 PROCEDURE — 700101 HCHG RX REV CODE 250: Performed by: STUDENT IN AN ORGANIZED HEALTH CARE EDUCATION/TRAINING PROGRAM

## 2022-05-09 PROCEDURE — 700101 HCHG RX REV CODE 250: Performed by: SURGERY

## 2022-05-09 PROCEDURE — 96375 TX/PRO/DX INJ NEW DRUG ADDON: CPT | Mod: XU

## 2022-05-09 PROCEDURE — 83690 ASSAY OF LIPASE: CPT

## 2022-05-09 PROCEDURE — 700102 HCHG RX REV CODE 250 W/ 637 OVERRIDE(OP): Performed by: SURGERY

## 2022-05-09 PROCEDURE — 99291 CRITICAL CARE FIRST HOUR: CPT

## 2022-05-09 PROCEDURE — 700111 HCHG RX REV CODE 636 W/ 250 OVERRIDE (IP): Performed by: STUDENT IN AN ORGANIZED HEALTH CARE EDUCATION/TRAINING PROGRAM

## 2022-05-09 PROCEDURE — 700111 HCHG RX REV CODE 636 W/ 250 OVERRIDE (IP): Performed by: EMERGENCY MEDICINE

## 2022-05-09 PROCEDURE — 80053 COMPREHEN METABOLIC PANEL: CPT

## 2022-05-09 PROCEDURE — 83605 ASSAY OF LACTIC ACID: CPT

## 2022-05-09 PROCEDURE — 501582 HCHG TROCAR, THRD BLADED: Performed by: SURGERY

## 2022-05-09 PROCEDURE — 88304 TISSUE EXAM BY PATHOLOGIST: CPT

## 2022-05-09 PROCEDURE — 700105 HCHG RX REV CODE 258: Performed by: SURGERY

## 2022-05-09 PROCEDURE — 96376 TX/PRO/DX INJ SAME DRUG ADON: CPT | Mod: XU

## 2022-05-09 PROCEDURE — 770001 HCHG ROOM/CARE - MED/SURG/GYN PRIV*

## 2022-05-09 PROCEDURE — 700105 HCHG RX REV CODE 258: Performed by: EMERGENCY MEDICINE

## 2022-05-09 PROCEDURE — A9270 NON-COVERED ITEM OR SERVICE: HCPCS | Performed by: STUDENT IN AN ORGANIZED HEALTH CARE EDUCATION/TRAINING PROGRAM

## 2022-05-09 PROCEDURE — 93005 ELECTROCARDIOGRAM TRACING: CPT

## 2022-05-09 PROCEDURE — 76705 ECHO EXAM OF ABDOMEN: CPT

## 2022-05-09 PROCEDURE — 85025 COMPLETE CBC W/AUTO DIFF WBC: CPT

## 2022-05-09 PROCEDURE — 501838 HCHG SUTURE GENERAL: Performed by: SURGERY

## 2022-05-09 PROCEDURE — 700102 HCHG RX REV CODE 250 W/ 637 OVERRIDE(OP): Performed by: STUDENT IN AN ORGANIZED HEALTH CARE EDUCATION/TRAINING PROGRAM

## 2022-05-09 PROCEDURE — 501571 HCHG TROCAR, SEPARATOR 12X100: Performed by: SURGERY

## 2022-05-09 PROCEDURE — 84484 ASSAY OF TROPONIN QUANT: CPT

## 2022-05-09 PROCEDURE — 700105 HCHG RX REV CODE 258: Performed by: STUDENT IN AN ORGANIZED HEALTH CARE EDUCATION/TRAINING PROGRAM

## 2022-05-09 PROCEDURE — 700117 HCHG RX CONTRAST REV CODE 255: Performed by: SURGERY

## 2022-05-09 PROCEDURE — 87040 BLOOD CULTURE FOR BACTERIA: CPT | Mod: 91

## 2022-05-09 PROCEDURE — BF131ZZ FLUOROSCOPY OF GALLBLADDER AND BILE DUCTS USING LOW OSMOLAR CONTRAST: ICD-10-PCS | Performed by: SURGERY

## 2022-05-09 PROCEDURE — 74300 X-RAY BILE DUCTS/PANCREAS: CPT

## 2022-05-09 PROCEDURE — 47562 LAPAROSCOPIC CHOLECYSTECTOMY: CPT | Mod: AS | Performed by: PHYSICIAN ASSISTANT

## 2022-05-09 PROCEDURE — 99223 1ST HOSP IP/OBS HIGH 75: CPT | Mod: 57 | Performed by: SURGERY

## 2022-05-09 PROCEDURE — 99140 ANES COMP EMERGENCY COND: CPT | Performed by: STUDENT IN AN ORGANIZED HEALTH CARE EDUCATION/TRAINING PROGRAM

## 2022-05-09 PROCEDURE — 47562 LAPAROSCOPIC CHOLECYSTECTOMY: CPT | Performed by: SURGERY

## 2022-05-09 PROCEDURE — 500002 HCHG ADHESIVE, DERMABOND: Performed by: SURGERY

## 2022-05-09 PROCEDURE — 160009 HCHG ANES TIME/MIN: Performed by: SURGERY

## 2022-05-09 PROCEDURE — 00790 ANES IPER UPR ABD NOS: CPT | Performed by: STUDENT IN AN ORGANIZED HEALTH CARE EDUCATION/TRAINING PROGRAM

## 2022-05-09 RX ORDER — OXYCODONE HYDROCHLORIDE 5 MG/1
5 TABLET ORAL
Status: DISCONTINUED | OUTPATIENT
Start: 2022-05-09 | End: 2022-05-10 | Stop reason: HOSPADM

## 2022-05-09 RX ORDER — ONDANSETRON 4 MG/1
4 TABLET, ORALLY DISINTEGRATING ORAL EVERY 4 HOURS PRN
Status: DISCONTINUED | OUTPATIENT
Start: 2022-05-09 | End: 2022-05-10 | Stop reason: HOSPADM

## 2022-05-09 RX ORDER — OXYCODONE HYDROCHLORIDE 10 MG/1
10 TABLET ORAL
Status: DISCONTINUED | OUTPATIENT
Start: 2022-05-09 | End: 2022-05-10 | Stop reason: HOSPADM

## 2022-05-09 RX ORDER — ONDANSETRON 2 MG/ML
4 INJECTION INTRAMUSCULAR; INTRAVENOUS
Status: DISCONTINUED | OUTPATIENT
Start: 2022-05-09 | End: 2022-05-09 | Stop reason: HOSPADM

## 2022-05-09 RX ORDER — CEFAZOLIN SODIUM 1 G/3ML
INJECTION, POWDER, FOR SOLUTION INTRAMUSCULAR; INTRAVENOUS PRN
Status: DISCONTINUED | OUTPATIENT
Start: 2022-05-09 | End: 2022-05-09 | Stop reason: SURG

## 2022-05-09 RX ORDER — DEXAMETHASONE SODIUM PHOSPHATE 4 MG/ML
INJECTION, SOLUTION INTRA-ARTICULAR; INTRALESIONAL; INTRAMUSCULAR; INTRAVENOUS; SOFT TISSUE PRN
Status: DISCONTINUED | OUTPATIENT
Start: 2022-05-09 | End: 2022-05-09 | Stop reason: SURG

## 2022-05-09 RX ORDER — TAMSULOSIN HYDROCHLORIDE 0.4 MG/1
0.8 CAPSULE ORAL DAILY
Status: DISCONTINUED | OUTPATIENT
Start: 2022-05-10 | End: 2022-05-10 | Stop reason: HOSPADM

## 2022-05-09 RX ORDER — IPRATROPIUM BROMIDE AND ALBUTEROL SULFATE 2.5; .5 MG/3ML; MG/3ML
3 SOLUTION RESPIRATORY (INHALATION)
Status: DISCONTINUED | OUTPATIENT
Start: 2022-05-09 | End: 2022-05-09 | Stop reason: HOSPADM

## 2022-05-09 RX ORDER — HYDROMORPHONE HYDROCHLORIDE 1 MG/ML
0.2 INJECTION, SOLUTION INTRAMUSCULAR; INTRAVENOUS; SUBCUTANEOUS
Status: DISCONTINUED | OUTPATIENT
Start: 2022-05-09 | End: 2022-05-09 | Stop reason: HOSPADM

## 2022-05-09 RX ORDER — ACETAMINOPHEN 500 MG
1000 TABLET ORAL EVERY 6 HOURS PRN
Status: DISCONTINUED | OUTPATIENT
Start: 2022-05-15 | End: 2022-05-10 | Stop reason: HOSPADM

## 2022-05-09 RX ORDER — HYDROMORPHONE HYDROCHLORIDE 1 MG/ML
0.1 INJECTION, SOLUTION INTRAMUSCULAR; INTRAVENOUS; SUBCUTANEOUS
Status: DISCONTINUED | OUTPATIENT
Start: 2022-05-09 | End: 2022-05-09 | Stop reason: HOSPADM

## 2022-05-09 RX ORDER — SODIUM CHLORIDE 9 MG/ML
1000 INJECTION, SOLUTION INTRAVENOUS ONCE
Status: COMPLETED | OUTPATIENT
Start: 2022-05-09 | End: 2022-05-09

## 2022-05-09 RX ORDER — HALOPERIDOL 5 MG/ML
1 INJECTION INTRAMUSCULAR
Status: DISCONTINUED | OUTPATIENT
Start: 2022-05-09 | End: 2022-05-09 | Stop reason: HOSPADM

## 2022-05-09 RX ORDER — OMEPRAZOLE 40 MG/1
40 CAPSULE, DELAYED RELEASE ORAL 2 TIMES DAILY
COMMUNITY

## 2022-05-09 RX ORDER — SODIUM CHLORIDE, SODIUM LACTATE, POTASSIUM CHLORIDE, CALCIUM CHLORIDE 600; 310; 30; 20 MG/100ML; MG/100ML; MG/100ML; MG/100ML
INJECTION, SOLUTION INTRAVENOUS CONTINUOUS
Status: DISCONTINUED | OUTPATIENT
Start: 2022-05-09 | End: 2022-05-10 | Stop reason: HOSPADM

## 2022-05-09 RX ORDER — MORPHINE SULFATE 4 MG/ML
4 INJECTION INTRAVENOUS ONCE
Status: COMPLETED | OUTPATIENT
Start: 2022-05-09 | End: 2022-05-09

## 2022-05-09 RX ORDER — SODIUM CHLORIDE, SODIUM LACTATE, POTASSIUM CHLORIDE, CALCIUM CHLORIDE 600; 310; 30; 20 MG/100ML; MG/100ML; MG/100ML; MG/100ML
INJECTION, SOLUTION INTRAVENOUS
Status: DISCONTINUED | OUTPATIENT
Start: 2022-05-09 | End: 2022-05-09 | Stop reason: SURG

## 2022-05-09 RX ORDER — LABETALOL HYDROCHLORIDE 5 MG/ML
INJECTION, SOLUTION INTRAVENOUS PRN
Status: DISCONTINUED | OUTPATIENT
Start: 2022-05-09 | End: 2022-05-09 | Stop reason: SURG

## 2022-05-09 RX ORDER — MIDAZOLAM HYDROCHLORIDE 1 MG/ML
1 INJECTION INTRAMUSCULAR; INTRAVENOUS
Status: DISCONTINUED | OUTPATIENT
Start: 2022-05-09 | End: 2022-05-09 | Stop reason: HOSPADM

## 2022-05-09 RX ORDER — LABETALOL HYDROCHLORIDE 5 MG/ML
5 INJECTION, SOLUTION INTRAVENOUS
Status: DISCONTINUED | OUTPATIENT
Start: 2022-05-09 | End: 2022-05-09 | Stop reason: HOSPADM

## 2022-05-09 RX ORDER — ONDANSETRON 2 MG/ML
INJECTION INTRAMUSCULAR; INTRAVENOUS PRN
Status: DISCONTINUED | OUTPATIENT
Start: 2022-05-09 | End: 2022-05-09 | Stop reason: SURG

## 2022-05-09 RX ORDER — HYDROMORPHONE HYDROCHLORIDE 1 MG/ML
0.5 INJECTION, SOLUTION INTRAMUSCULAR; INTRAVENOUS; SUBCUTANEOUS
Status: DISCONTINUED | OUTPATIENT
Start: 2022-05-09 | End: 2022-05-10 | Stop reason: HOSPADM

## 2022-05-09 RX ORDER — ONDANSETRON 2 MG/ML
4 INJECTION INTRAMUSCULAR; INTRAVENOUS EVERY 4 HOURS PRN
Status: DISCONTINUED | OUTPATIENT
Start: 2022-05-09 | End: 2022-05-10 | Stop reason: HOSPADM

## 2022-05-09 RX ORDER — HYDRALAZINE HYDROCHLORIDE 20 MG/ML
INJECTION INTRAMUSCULAR; INTRAVENOUS PRN
Status: DISCONTINUED | OUTPATIENT
Start: 2022-05-09 | End: 2022-05-09 | Stop reason: SURG

## 2022-05-09 RX ORDER — DIPHENHYDRAMINE HYDROCHLORIDE 50 MG/ML
12.5 INJECTION INTRAMUSCULAR; INTRAVENOUS
Status: DISCONTINUED | OUTPATIENT
Start: 2022-05-09 | End: 2022-05-09 | Stop reason: HOSPADM

## 2022-05-09 RX ORDER — OXYCODONE HCL 5 MG/5 ML
10 SOLUTION, ORAL ORAL
Status: COMPLETED | OUTPATIENT
Start: 2022-05-09 | End: 2022-05-09

## 2022-05-09 RX ORDER — HYDROMORPHONE HYDROCHLORIDE 1 MG/ML
0.4 INJECTION, SOLUTION INTRAMUSCULAR; INTRAVENOUS; SUBCUTANEOUS
Status: DISCONTINUED | OUTPATIENT
Start: 2022-05-09 | End: 2022-05-09 | Stop reason: HOSPADM

## 2022-05-09 RX ORDER — HYDRALAZINE HYDROCHLORIDE 20 MG/ML
5 INJECTION INTRAMUSCULAR; INTRAVENOUS
Status: DISCONTINUED | OUTPATIENT
Start: 2022-05-09 | End: 2022-05-09 | Stop reason: HOSPADM

## 2022-05-09 RX ORDER — OXYCODONE HCL 5 MG/5 ML
5 SOLUTION, ORAL ORAL
Status: COMPLETED | OUTPATIENT
Start: 2022-05-09 | End: 2022-05-09

## 2022-05-09 RX ORDER — METHADONE HYDROCHLORIDE 10 MG/ML
60 CONCENTRATE ORAL DAILY
COMMUNITY

## 2022-05-09 RX ORDER — BUPIVACAINE HYDROCHLORIDE AND EPINEPHRINE 5; 5 MG/ML; UG/ML
INJECTION, SOLUTION EPIDURAL; INTRACAUDAL; PERINEURAL
Status: DISCONTINUED | OUTPATIENT
Start: 2022-05-09 | End: 2022-05-09 | Stop reason: HOSPADM

## 2022-05-09 RX ORDER — ONDANSETRON 2 MG/ML
4 INJECTION INTRAMUSCULAR; INTRAVENOUS ONCE
Status: COMPLETED | OUTPATIENT
Start: 2022-05-09 | End: 2022-05-09

## 2022-05-09 RX ORDER — MEPERIDINE HYDROCHLORIDE 25 MG/ML
12.5 INJECTION INTRAMUSCULAR; INTRAVENOUS; SUBCUTANEOUS
Status: DISCONTINUED | OUTPATIENT
Start: 2022-05-09 | End: 2022-05-09 | Stop reason: HOSPADM

## 2022-05-09 RX ORDER — OMEPRAZOLE 20 MG/1
40 CAPSULE, DELAYED RELEASE ORAL 2 TIMES DAILY
Status: DISCONTINUED | OUTPATIENT
Start: 2022-05-09 | End: 2022-05-10 | Stop reason: HOSPADM

## 2022-05-09 RX ORDER — HYDROMORPHONE HYDROCHLORIDE 2 MG/ML
INJECTION, SOLUTION INTRAMUSCULAR; INTRAVENOUS; SUBCUTANEOUS PRN
Status: DISCONTINUED | OUTPATIENT
Start: 2022-05-09 | End: 2022-05-09 | Stop reason: SURG

## 2022-05-09 RX ORDER — ESMOLOL HYDROCHLORIDE 10 MG/ML
INJECTION INTRAVENOUS PRN
Status: DISCONTINUED | OUTPATIENT
Start: 2022-05-09 | End: 2022-05-09 | Stop reason: SURG

## 2022-05-09 RX ORDER — METOPROLOL TARTRATE 1 MG/ML
1 INJECTION, SOLUTION INTRAVENOUS
Status: DISCONTINUED | OUTPATIENT
Start: 2022-05-09 | End: 2022-05-09 | Stop reason: HOSPADM

## 2022-05-09 RX ORDER — ACETAMINOPHEN 500 MG
1000 TABLET ORAL EVERY 6 HOURS
Status: DISCONTINUED | OUTPATIENT
Start: 2022-05-10 | End: 2022-05-10 | Stop reason: HOSPADM

## 2022-05-09 RX ADMIN — ROCURONIUM BROMIDE 50 MG: 10 INJECTION, SOLUTION INTRAVENOUS at 19:37

## 2022-05-09 RX ADMIN — SODIUM CHLORIDE, POTASSIUM CHLORIDE, SODIUM LACTATE AND CALCIUM CHLORIDE: 600; 310; 30; 20 INJECTION, SOLUTION INTRAVENOUS at 19:32

## 2022-05-09 RX ADMIN — HYDROMORPHONE HYDROCHLORIDE 0.4 MG: 2 INJECTION INTRAMUSCULAR; INTRAVENOUS; SUBCUTANEOUS at 19:38

## 2022-05-09 RX ADMIN — HYDROMORPHONE HYDROCHLORIDE 0.6 MG: 2 INJECTION INTRAMUSCULAR; INTRAVENOUS; SUBCUTANEOUS at 19:57

## 2022-05-09 RX ADMIN — ONDANSETRON 4 MG: 2 INJECTION INTRAMUSCULAR; INTRAVENOUS at 15:09

## 2022-05-09 RX ADMIN — MORPHINE SULFATE 4 MG: 4 INJECTION INTRAVENOUS at 17:02

## 2022-05-09 RX ADMIN — ACETAMINOPHEN 1000 MG: 500 TABLET ORAL at 23:15

## 2022-05-09 RX ADMIN — SODIUM CHLORIDE, POTASSIUM CHLORIDE, SODIUM LACTATE AND CALCIUM CHLORIDE: 600; 310; 30; 20 INJECTION, SOLUTION INTRAVENOUS at 23:11

## 2022-05-09 RX ADMIN — ONDANSETRON 4 MG: 2 INJECTION INTRAMUSCULAR; INTRAVENOUS at 19:52

## 2022-05-09 RX ADMIN — LABETALOL HYDROCHLORIDE 5 MG: 5 INJECTION, SOLUTION INTRAVENOUS at 19:59

## 2022-05-09 RX ADMIN — CEFAZOLIN 2 G: 330 INJECTION, POWDER, FOR SOLUTION INTRAMUSCULAR; INTRAVENOUS at 19:38

## 2022-05-09 RX ADMIN — ALFENTANIL HYDROCHLORIDE 1000 MCG: 500 INJECTION INTRAVENOUS at 19:37

## 2022-05-09 RX ADMIN — SUGAMMADEX 200 MG: 100 INJECTION, SOLUTION INTRAVENOUS at 20:44

## 2022-05-09 RX ADMIN — MORPHINE SULFATE 4 MG: 4 INJECTION INTRAVENOUS at 15:09

## 2022-05-09 RX ADMIN — PIPERACILLIN AND TAZOBACTAM 3.38 G: 3; .375 INJECTION, POWDER, LYOPHILIZED, FOR SOLUTION INTRAVENOUS; PARENTERAL at 17:04

## 2022-05-09 RX ADMIN — PROPOFOL 120 MG: 10 INJECTION, EMULSION INTRAVENOUS at 19:37

## 2022-05-09 RX ADMIN — SODIUM CHLORIDE 1000 ML: 9 INJECTION, SOLUTION INTRAVENOUS at 15:15

## 2022-05-09 RX ADMIN — OXYCODONE HYDROCHLORIDE 10 MG: 5 SOLUTION ORAL at 22:32

## 2022-05-09 RX ADMIN — DEXAMETHASONE SODIUM PHOSPHATE 4 MG: 4 INJECTION, SOLUTION INTRA-ARTICULAR; INTRALESIONAL; INTRAMUSCULAR; INTRAVENOUS; SOFT TISSUE at 19:39

## 2022-05-09 RX ADMIN — OMEPRAZOLE 40 MG: 20 CAPSULE, DELAYED RELEASE ORAL at 23:15

## 2022-05-09 RX ADMIN — ESMOLOL HYDROCHLORIDE 20 MG: 100 INJECTION, SOLUTION INTRAVENOUS at 19:40

## 2022-05-09 RX ADMIN — HYDRALAZINE HYDROCHLORIDE 4 MG: 20 INJECTION INTRAMUSCULAR; INTRAVENOUS at 20:29

## 2022-05-09 RX ADMIN — LABETALOL HYDROCHLORIDE 5 MG: 5 INJECTION, SOLUTION INTRAVENOUS at 20:04

## 2022-05-09 ASSESSMENT — LIFESTYLE VARIABLES
HAVE PEOPLE ANNOYED YOU BY CRITICIZING YOUR DRINKING: NO
EVER FELT BAD OR GUILTY ABOUT YOUR DRINKING: NO
TOTAL SCORE: 0
HAVE YOU EVER FELT YOU SHOULD CUT DOWN ON YOUR DRINKING: NO
ON A TYPICAL DAY WHEN YOU DRINK ALCOHOL HOW MANY DRINKS DO YOU HAVE: 0
CONSUMPTION TOTAL: NEGATIVE
EVER HAD A DRINK FIRST THING IN THE MORNING TO STEADY YOUR NERVES TO GET RID OF A HANGOVER: NO
TOTAL SCORE: 0
DOES PATIENT WANT TO STOP DRINKING: NO
ALCOHOL_USE: NO
AVERAGE NUMBER OF DAYS PER WEEK YOU HAVE A DRINK CONTAINING ALCOHOL: 0
HOW MANY TIMES IN THE PAST YEAR HAVE YOU HAD 5 OR MORE DRINKS IN A DAY: 0
TOTAL SCORE: 0

## 2022-05-09 ASSESSMENT — PAIN DESCRIPTION - PAIN TYPE
TYPE: ACUTE PAIN;SURGICAL PAIN
TYPE: SURGICAL PAIN
TYPE: ACUTE PAIN
TYPE: ACUTE PAIN;SURGICAL PAIN

## 2022-05-09 ASSESSMENT — PAIN SCALES - GENERAL: PAIN_LEVEL: 1

## 2022-05-09 ASSESSMENT — FIBROSIS 4 INDEX: FIB4 SCORE: 1.18

## 2022-05-09 NOTE — ED TRIAGE NOTES
WC to triage w/ c/o epigastric pain & nausea x 1 day.  Hx of gallstones, states this feeling similar.  Patient appears uncomfortable.

## 2022-05-09 NOTE — ED NOTES
Med rec partially completed per pt   Allergies reviewed  No PO antibiotics in the last 30 days    Pt states that he takes Methadone 55 mg daily from the [facility] [address]   Dose has not yet been verified due to facility being closed at this time    n/a

## 2022-05-09 NOTE — ED PROVIDER NOTES
CHIEF COMPLAINT  Chief Complaint   Patient presents with   • Epigastric Pain   • Nausea       HPI  Karlos Del Toro is a 57 y.o. male who presents with epigastric and right upper quadrant pain that started about 24 hours ago.  The patient has a known history of gallstones and states that this feels similar to prior attacks.  He notes that he try to eat 2 bites of eggs this morning about 6 hours ago and could not keep it down.  He notes no other episodes of vomiting but has had nausea.  No diarrhea.  Prior abdominal surgeries include inguinal hernia repairs in [facility].  No history of of cardiac or pulmonary disease.  He does have a history of back pain and is on methadone.  Otherwise no fever.  No chest pain or shortness of breath.  Patient reports a history of esophagitis/peptic ulcer disease as well and was scoped about 6 months ago for that.    REVIEW OF SYSTEMS  All other systems are negative.     PAST MEDICAL HISTORY  Past Medical History:   Diagnosis Date   • Arthritis    • Back injury    • Backpain     since 1983   • DDD (degenerative disc disease), lumbar    • Depression    • DJD (degenerative joint disease)    • Gallstones    • GERD (gastroesophageal reflux disease)    • Pain management contract signed 7/20/2012   • Sleep apnea    • Ulcer        FAMILY HISTORY  Family History   Problem Relation Age of Onset   • Genetic Disorder Neg Hx        SOCIAL HISTORY  Social History     Socioeconomic History   • Marital status:    Tobacco Use   • Smoking status: Current Every Day Smoker     Packs/day: 0.50     Years: 30.00     Pack years: 15.00     Types: Cigarettes   • Smokeless tobacco: Never Used   Vaping Use   • Vaping Use: Never used   Substance and Sexual Activity   • Alcohol use: No   • Drug use: Yes     Comment: thc   • Sexual activity: Yes     Partners: Female       SURGICAL HISTORY  Past Surgical History:   Procedure Laterality Date   • GASTROSCOPY-ENDO N/A 11/21/2019    Procedure:  "GASTROSCOPY;  Surgeon: Armin Levy M.D.;  Location: [facility];  Service: Gastroenterology   • URETEROSCOPY  10/11/2013    Performed by Kimberley Morocho M.D. at [facility]   • CYSTOSCOPY STENT PLACEMENT  10/11/2013    Performed by Kimberley Morocho M.D. at [facility]   • LASERTRIPSY  10/11/2013    Performed by Kimberley Morocho M.D. at [facility]   • HERNIA REP HIATAL     • KNEE ARTHROSCOPY      r&L   • TONSILLECTOMY         CURRENT MEDICATIONS  Home Medications    **Home medications have not yet been reviewed for this encounter**         ALLERGIES  No Known Allergies    PHYSICAL EXAM  VITAL SIGNS: /81   Pulse 81   Temp 36.9 °C (98.5 °F) (Temporal)   Resp 16   Ht 1.753 m (5' 9\")   Wt 91.2 kg (201 lb 1 oz)   SpO2 97%   BMI 29.69 kg/m²      Constitutional: Well developed, Well nourished, No acute distress, Non-toxic appearance.   HENT: Normocephalic, Atraumatic,  mucous membranes moist, no erythema, exudates, swelling, or masses, nares patent  Eyes: nonicteric  Neck: Supple, no meningismus  Lymphatic: No lymphadenopathy noted.   Cardiovascular: Regular rate and rhythm, no gallops rubs or murmurs  Lungs: Clear bilaterally   Abdomen: Mild to moderate tenderness in the epigastric and right upper quadrant region, no rebound or guarding, there is no tenderness in the lower abdomen  Skin: Warm, Dry, no rash  Back: No tenderness, No CVA tenderness.   Genitalia: Deferred  Rectal: Deferred  Extremities: No edema  Neurologic: Alert, appropriate, follows commands, moving all extremities, normal speech   Psychiatric: Affect normal    RADIOLOGY/PROCEDURES  US-RUQ   Final Result      Cholelithiasis with gallbladder wall thickening that could indicate cholecystitis      Extrahepatic biliary ductal dilatation, 11 mm. This may indicate choledocholithiasis that is not visualized favored over ampullary stricture or mass. MRCP may help clarify      Bowel " gas technically limits the study somewhat        Results for orders placed or performed during the hospital encounter of 05/09/22   CBC WITH DIFFERENTIAL   Result Value Ref Range    WBC 16.1 (H) 4.8 - 10.8 K/uL    RBC 5.00 4.70 - 6.10 M/uL    Hemoglobin 15.3 14.0 - 18.0 g/dL    Hematocrit 46.0 42.0 - 52.0 %    MCV 92.0 81.4 - 97.8 fL    MCH 30.6 27.0 - 33.0 pg    MCHC 33.3 (L) 33.7 - 35.3 g/dL    RDW 49.6 35.9 - 50.0 fL    Platelet Count 247 164 - 446 K/uL    MPV 10.7 9.0 - 12.9 fL    Neutrophils-Polys 84.10 (H) 44.00 - 72.00 %    Lymphocytes 9.40 (L) 22.00 - 41.00 %    Monocytes 5.30 0.00 - 13.40 %    Eosinophils 0.50 0.00 - 6.90 %    Basophils 0.30 0.00 - 1.80 %    Immature Granulocytes 0.40 0.00 - 0.90 %    Nucleated RBC 0.00 /100 WBC    Neutrophils (Absolute) 13.53 (H) 1.82 - 7.42 K/uL    Lymphs (Absolute) 1.52 1.00 - 4.80 K/uL    Monos (Absolute) 0.86 (H) 0.00 - 0.85 K/uL    Eos (Absolute) 0.08 0.00 - 0.51 K/uL    Baso (Absolute) 0.05 0.00 - 0.12 K/uL    Immature Granulocytes (abs) 0.07 0.00 - 0.11 K/uL    NRBC (Absolute) 0.00 K/uL   COMP METABOLIC PANEL   Result Value Ref Range    Sodium 137 135 - 145 mmol/L    Potassium 4.6 3.6 - 5.5 mmol/L    Chloride 101 96 - 112 mmol/L    Co2 27 20 - 33 mmol/L    Anion Gap 9.0 7.0 - 16.0    Glucose 99 65 - 99 mg/dL    Bun 13 8 - 22 mg/dL    Creatinine 0.89 0.50 - 1.40 mg/dL    Calcium 9.6 8.5 - 10.5 mg/dL    AST(SGOT) 29 12 - 45 U/L    ALT(SGPT) 29 2 - 50 U/L    Alkaline Phosphatase 71 30 - 99 U/L    Total Bilirubin 0.3 0.1 - 1.5 mg/dL    Albumin 4.2 3.2 - 4.9 g/dL    Total Protein 7.3 6.0 - 8.2 g/dL    Globulin 3.1 1.9 - 3.5 g/dL    A-G Ratio 1.4 g/dL   LIPASE   Result Value Ref Range    Lipase 9 (L) 11 - 82 U/L   ESTIMATED GFR   Result Value Ref Range    GFR (CKD-EPI) 100 >60 mL/min/1.73 m 2   TROPONIN   Result Value Ref Range    Troponin T 27 (H) 6 - 19 ng/L   EKG   Result Value Ref Range    Report       [facility] Emergency Dept.    Test Date:   2022  Pt Name:    ALYCIA GRAF                Department: ER  MRN:        [id number]                      Room:  Gender:     Male                         Technician: [id number]  :        1965                   Requested By:ER TRIAGE PROTOCOL  Order #:    [id number]                    Reading MD:    Measurements  Intervals                                Axis  Rate:       71                           P:          71  TX:         172                          QRS:        22  QRSD:       124                          T:          45  QT:         400  QTc:        435    Interpretive Statements  SINUS RHYTHM  RIGHT BUNDLE BRANCH BLOCK  Compared to ECG 10/11/2013 10:01:54  Right bundle-branch block now present  Left posterior fascicular block no longer present  Incomplete right bundle-branch block no longer present           COURSE & MEDICAL DECISION MAKING  Pertinent Labs & Imaging studies reviewed. (See chart for details)  This is a 57-year-old who presents with cholecystitis with a enlarged common bile duct at 1.1 cm.  Labs demonstrate a leukocytosis but transaminases and lipase are all unremarkable.  Case discussed with Dr. Pierre who is on-call for general surgery.  I reviewed the ultrasound findings with him as well as the labs sent at this point it appears they are going to take him to the OR for cholecystectomy with possible intraoperative cholangiogram.  He has been written for Zosyn.  He will be admitted to general surgery.    FINAL IMPRESSION  1.  Cholecystitis  2.   3.         Electronically signed by: Vazquez Caro M.D., 2022 2:50 PM

## 2022-05-09 NOTE — ED NOTES
PT from lobby via wheel chair. PT standby assist to bed. PT changed into gown and placed on monitors.

## 2022-05-10 VITALS
BODY MASS INDEX: 29.78 KG/M2 | OXYGEN SATURATION: 94 % | RESPIRATION RATE: 18 BRPM | SYSTOLIC BLOOD PRESSURE: 147 MMHG | HEIGHT: 69 IN | TEMPERATURE: 98.4 F | HEART RATE: 78 BPM | DIASTOLIC BLOOD PRESSURE: 84 MMHG | WEIGHT: 201.06 LBS

## 2022-05-10 LAB — PATHOLOGY CONSULT NOTE: NORMAL

## 2022-05-10 PROCEDURE — A9270 NON-COVERED ITEM OR SERVICE: HCPCS | Performed by: NURSE PRACTITIONER

## 2022-05-10 PROCEDURE — 700102 HCHG RX REV CODE 250 W/ 637 OVERRIDE(OP): Performed by: SURGERY

## 2022-05-10 PROCEDURE — A9270 NON-COVERED ITEM OR SERVICE: HCPCS | Performed by: SURGERY

## 2022-05-10 PROCEDURE — 700102 HCHG RX REV CODE 250 W/ 637 OVERRIDE(OP): Performed by: NURSE PRACTITIONER

## 2022-05-10 RX ORDER — POLYETHYLENE GLYCOL 3350 17 G/17G
17 POWDER, FOR SOLUTION ORAL DAILY
Refills: 3 | COMMUNITY
Start: 2022-05-11

## 2022-05-10 RX ORDER — POLYETHYLENE GLYCOL 3350 17 G/17G
1 POWDER, FOR SOLUTION ORAL DAILY
Status: DISCONTINUED | OUTPATIENT
Start: 2022-05-10 | End: 2022-05-10 | Stop reason: HOSPADM

## 2022-05-10 RX ORDER — ACETAMINOPHEN 500 MG
1000 TABLET ORAL EVERY 6 HOURS PRN
COMMUNITY
Start: 2022-05-10

## 2022-05-10 RX ADMIN — OXYCODONE HYDROCHLORIDE 10 MG: 10 TABLET ORAL at 02:52

## 2022-05-10 RX ADMIN — OMEPRAZOLE 40 MG: 20 CAPSULE, DELAYED RELEASE ORAL at 06:01

## 2022-05-10 RX ADMIN — OXYCODONE 5 MG: 5 TABLET ORAL at 09:01

## 2022-05-10 RX ADMIN — POLYETHYLENE GLYCOL 3350 1 PACKET: 17 POWDER, FOR SOLUTION ORAL at 08:20

## 2022-05-10 RX ADMIN — ACETAMINOPHEN 1000 MG: 500 TABLET ORAL at 06:01

## 2022-05-10 RX ADMIN — TAMSULOSIN HYDROCHLORIDE 0.8 MG: 0.4 CAPSULE ORAL at 06:01

## 2022-05-10 RX ADMIN — OXYCODONE 5 MG: 5 TABLET ORAL at 06:01

## 2022-05-10 ASSESSMENT — COGNITIVE AND FUNCTIONAL STATUS - GENERAL
MOBILITY SCORE: 24
SUGGESTED CMS G CODE MODIFIER DAILY ACTIVITY: CH
SUGGESTED CMS G CODE MODIFIER MOBILITY: CH
DAILY ACTIVITIY SCORE: 24

## 2022-05-10 ASSESSMENT — PATIENT HEALTH QUESTIONNAIRE - PHQ9
SUM OF ALL RESPONSES TO PHQ9 QUESTIONS 1 AND 2: 0
1. LITTLE INTEREST OR PLEASURE IN DOING THINGS: NOT AT ALL
2. FEELING DOWN, DEPRESSED, IRRITABLE, OR HOPELESS: NOT AT ALL
SUM OF ALL RESPONSES TO PHQ9 QUESTIONS 1 AND 2: 0
1. LITTLE INTEREST OR PLEASURE IN DOING THINGS: NOT AT ALL
2. FEELING DOWN, DEPRESSED, IRRITABLE, OR HOPELESS: NOT AT ALL

## 2022-05-10 ASSESSMENT — PAIN DESCRIPTION - PAIN TYPE
TYPE: SURGICAL PAIN

## 2022-05-10 NOTE — PROGRESS NOTES
Discharge Summary    DATE OF ADMISSION: 5/9/2022    DATE OF DISCHARGE: 5/10/2022    DISCHARGE DIAGNOSIS:  ? Acute cholecystitis   ? Back pain, chronic    CONSULTATIONS:  ? none    PROCEDURES:  ? Procedure completed by Dr. Smith on 5/9/2022:  laparoscopic cholecystectomy    BRIEF HPI and HOSPITAL COURSE:  ? Admitted with above, see admission history and physical. Underwent procedure as above. On day of discharge, the patient has stable vital signs, on room air, tolerating an oral diet, and pain is well controlled with PO meds.  His port site incisions are well approximated with surgical glue.  The patient is stable for discharge to home.    DISPOSITION: Discharged home on 5/10/2022. The patient was counseled and questions were answered. Specifically, signs and symptoms of infection, respiratory decompensation, and persistent or worsening pain were discussed and the patient agrees to seek medical attention if any of these develop.    DISCHARGE MEDICATIONS:  The patients controlled substance history was reviewed and a controlled substance use informed consent (if applicable) was provided by [facility] and the patient has been prescribed.     Medication List      Start taking these medications      Instructions   acetaminophen 500 MG Tabs  Commonly known as: TYLENOL   Take 2 Tablets by mouth every 6 hours as needed.  Dose: 1,000 mg     polyethylene glycol/lytes 17 g Pack  Start taking on: May 11, 2022  Commonly known as: MIRALAX   Take 1 Packet by mouth every day.  Dose: 17 g        Continue taking these medications      Instructions   methadone 10 MG/ML Conc  Commonly known as: DOLOPHINE   Take 60 mg by mouth every day. Verified METHADONE DOSE on 5/10 60 mg(sixty) with [facility] [place] on [place] [phone]  Dose: 60 mg     omeprazole 40 MG delayed-release capsule  Commonly known as: PRILOSEC   Take 40 mg by mouth 2 times a day.  Dose: 40 mg     tamsulosin 0.4 MG capsule  Commonly known as:  FLOMAX   Take 0.8 mg by mouth every day.  Dose: 0.8 mg            ACTIVITY:  No strenuous exercise or heavy lifting (more than 10 lbs) until released in follow up.    WOUND CARE:  You may shower, but do not submerge in a bath for at least two weeks. If you have wound dressings, they may come off after 48 hours. If you have skin glue to the wound, this will fall off on its own, do not pick at it. If you have steri strips to the wound, these will fall off on their own, do not pick at them, may trim the edges if needed.    DIET:  Orders Placed This Encounter   Procedures   • Diet Order Diet: Regular     Standing Status:   Standing     Number of Occurrences:   1     Order Specific Question:   Diet:     Answer:   Regular [1]       FOLLOW UP:  Cristi Smith M.D.  [address] [id number] [phone]    Schedule an appointment as soon as possible for a visit in 1 week      Izzy Driver M.D.  [address]  [place] [address]  [phone]    Schedule an appointment as soon as possible for a visit in 1 week        TIME SPENT ON DISCHARGE: 30 minutes      ____________________________________________  SANDEE Crespo    DD: 5/10/2022 9:08 AM

## 2022-05-10 NOTE — ANESTHESIA PREPROCEDURE EVALUATION
Case: [id number] Date/Time: 05/09/22 1845    Procedure: CHOLECYSTECTOMY, LAPAROSCOPIC psb GRAMS    Location: [place] [address] / SURGERY [facility]    Surgeons: Cristi Smith M.D.          Relevant Problems   PULMONARY   (positive) Pneumonia      GI   (positive) GERD (gastroesophageal reflux disease)         (positive) Kidney stone       Physical Exam    Airway   Mallampati: II  TM distance: >3 FB  Neck ROM: full       Cardiovascular - normal exam  Rhythm: regular  Rate: normal  (-) murmur     Dental       Very poor dentition    Pulmonary - normal exam  Breath sounds clear to auscultation     Abdominal    Neurological - normal exam               Anesthesia Plan    ASA 3- EMERGENT   ASA physical status 3 criteria: alcohol and/or substance dependence or abuseASA physical status emergent criteria: compromised vital organ, limb or tissue    Plan - general       Airway plan will be ETT          Induction: intravenous    Postoperative Plan: Postoperative administration of opioids is intended.    Pertinent diagnostic labs and testing reviewed    Informed Consent:    Anesthetic plan and risks discussed with patient.    Use of blood products discussed with: patient whom consented to blood products.

## 2022-05-10 NOTE — PROGRESS NOTES
Pt up to floor at 2305. Pt ambulated to bed from [facility] with no assist and steady gait.  Assessment complete.  A&O x 4. Patient calls appropriately.  Patient has 7/10 pain. Medicated per MAR in PACU.  See skin note.  Tolerating regular diet. Denies N/V.  + void in PACU. Last BM PTA.  Reviewed plan of care with patient. Call light and personal belongings within reach. Hourly rounding in place. All needs met at this time.

## 2022-05-10 NOTE — PROGRESS NOTES
Patient discharged home. Patient AOX 4.  IV removed, discharge instructions provided to patient and reviewed with them. All questions answered, patient provided copy of discharge instructions and instructed on when to F/U with MD. Patient walked off unit. Patient discharged into the care of his wife.

## 2022-05-10 NOTE — PROGRESS NOTES
4 Eyes Skin Assessment Completed by Heather MARQUES RN and RUKHSANA Beaulieu.    Head WDL  Ears WDL  Nose WDL  Mouth WDL  Neck WDL  Breast/Chest WDL  Shoulder Blades WDL  Spine WDL  (R) Arm/Elbow/Hand WDL  (L) Arm/Elbow/Hand Bruising  Abdomen Incisions - x5 lap sites with dermabond, CDI. One to midline with scant drainage that has been there since surgery per PACU nurse.  Groin WDL  Scrotum/Coccyx/Buttocks WDL  (R) Leg Redness, varicose veins  (L) Leg Redness, varicose veins  (R) Heel/Foot/Toe Redness  (L) Heel/Foot/Toe Redness          Devices In Places Blood Pressure Cuff and Nasal Cannula      Interventions In Place Pressure Redistribution Mattress    Possible Skin Injury No    Pictures Uploaded Into Epic N/A  Wound Consult Placed N/A  RN Wound Prevention Protocol Ordered No

## 2022-05-10 NOTE — CARE PLAN
Problem: Pain - Standard  Goal: Alleviation of pain or a reduction in pain to the patient’s comfort goal  Outcome: Progressing     Problem: Knowledge Deficit - Standard  Goal: Patient and family/care givers will demonstrate understanding of plan of care, disease process/condition, diagnostic tests and medications  Outcome: Progressing       The patient is Stable - Low risk of patient condition declining or worsening    Shift Goals  Clinical Goals: monitor sx sites, pain control    Progress made toward(s) clinical / shift goals: POC discussed with pt. Pt pain managed with prn medication per MAR.

## 2022-05-10 NOTE — PROGRESS NOTES
Med rec complete  Verified METHADONE DOSE on 5/10/22    60 mg(sixty) daily with [facility] on [address] [phone]    [facility] will need pt to provide MARs showing METHADONE dosing upon discharge.

## 2022-05-10 NOTE — ANESTHESIA TIME REPORT
Anesthesia Start and Stop Event Times     Date Time Event    5/9/2022 1925 Ready for Procedure     1932 Anesthesia Start     2100 Anesthesia Stop        Responsible Staff  05/09/22    Name Role Begin End    Alcides Noonan M.D. Anesth 1932 2100        Overtime Reason:  no overtime (within assigned shift)    Comments:

## 2022-05-10 NOTE — PROGRESS NOTES
SBAR report received, patient in bed sleeping at this time. No s/sx of distress. Patient with call light in reach and bed in lowest locked position. Will monitor.

## 2022-05-10 NOTE — ANESTHESIA POSTPROCEDURE EVALUATION
Patient: Karlos Del Toro    Procedure Summary     Date: 05/09/22 Room / Location: [place] [address] / SURGERY [facility]    Anesthesia Start: 1932 Anesthesia Stop: 2100    Procedure: CHOLECYSTECTOMY, LAPAROSCOPIC WITH INTRAOPERATIVE CHOLANGIOGRAMS (N/A Abdomen) Diagnosis: (ACUTE CHOLECYSTITIS)    Surgeons: Cristi Smith M.D. Responsible Provider: Alcides Noonan M.D.    Anesthesia Type: general ASA Status: 3 - Emergent          Final Anesthesia Type: general  Last vitals  BP   Blood Pressure: 144/80    Temp   36.2 °C (97.1 °F)    Pulse   71   Resp   12    SpO2   95 %      Anesthesia Post Evaluation    Patient location during evaluation: PACU  Patient participation: complete - patient participated  Level of consciousness: awake and alert  Pain score: 1    Airway patency: patent  Anesthetic complications: no  Cardiovascular status: hemodynamically stable  Respiratory status: acceptable  Hydration status: euvolemic    PONV: none          No complications documented.     Nurse Pain Score: 1 (NPRS)

## 2022-05-10 NOTE — OP REPORT
Surgeon: Cristi Smith MD   Assistant: Kacy Calderon PA-C  Preoperative diagnosis: Acute cholecystitis   Postop diagnosis: Acute cholecystitis   Procedure: Laparoscopic cholecystectomy   Anesthesia: General endotracheal anesthesia   Anesthesiologist: Alcides Noonan MD  Indications: 57-year-old man with evidence by history, physical, laboratory data, and imaging of acute cholecystitis.  There is also question of choledocholithiasis.  A laparoscopic cholecystectomy with cholangiogram is therefore indicated.  Narrative: The procedure was discussed in detail with the patient including the laparoscopic approach, the potential for converting to an open procedure, and the associated risk of bleeding, infection, abscess, and hematoma. Also discussed the risk of postoperative bile leak, common bile duct stricture, common bile duct transection, and the significance of these complications.  I also discussed the plan for cholangiogram.  The patient understood all of the above and wished to proceed. The patient was placed under anesthesia by Dr. Noonan. Patient's abdomen was prepped with chlorhexidine prep and sterile drapes. After a time out an infraumbilical incision was made. Through this incision the peritoneal cavity was entered using the open Hason entry technique. pneumoperitoneum was achieved with co2 insufflation to a pressure of 15 mmhg mercury. under direct visualization a 12 millimeters subxiphoid and two 5mm subcostal ports were placed. the gallbladder was identified.  It was noted to be tense and distended.  It had to be drained in order to be able to grasp it.  After it was drained it was then retracted superiorly and laterally. multiple adhesions were noted and these were carefully taken down. the base of the gallbladder was carefully dissected. the cystic duct was identified and could be seen to clearly exit the gallbladder and retract laterally along the gallbladder. In  a similar fashion the cystic artery was  identified and dissected away from surrounding connective tissue. a critical view was obtained.  A clip was then placed distally on the cystic duct.  The duct was partially transected.  A Baltimore catheter was placed through a separate stab incision.  The catheter was threaded into the cystic duct and secured in place with a clip.  A cholangiogram was obtained which showed that the cannulated structure was indeed the cystic duct.  The right and left hepatic's were noted to fill nicely.  The common duct filled nicely.  There was no emptying of contrast into the bowel.  I could not determine with certainty if there was a filling defect such as a stone or whether there was simply failure of the sphincter to relax.  Images were sent for radiologic interpretation.  The Baltimore catheter was then removed from the cystic duct.  Subsequent to this 3 clips were placed proximally on the duct and it was divided with scissors. Similarly, the cystic artery was clipped twice proximally and one distally and divided sharply with scissors. The gallbladder was then dissected off the liver bed and placed in a bag retrieval device.  Again, it was quite enlarged, edematous, and discolored, consistent with acute cholecystitis.  Hemostasis was assured with cautery. There was no evidence of bleeding or bile leak. Ports removed and the pneumoperitoneum was released. The infraumbilical fascia was approximated with an 0 Vicryl stitch. The subcutaneous tissue was irrigated and the skin on all incisions was approximatedwith 4.0 vicryl  stitches. Dermabond was placed. The patient tolerated procedure well without apparent complication. Final counts were reported as correct.  Wound class was class III.    The first assist, Kacy Calderon PA-C, was necessary in this case due to the complexity of the operation.  Kacy assisted with port placement, holding of retractors and managing the laparoscope.  Kacy also assisted with port removal and incision  closure.

## 2022-05-10 NOTE — ANESTHESIA PROCEDURE NOTES
Airway    Date/Time: 5/9/2022 7:38 PM  Performed by: Alcides Noonan M.D.  Authorized by: Alcides Noonan M.D.     Location:  OR  Urgency:  Elective  Indications for Airway Management:  Anesthesia      Spontaneous Ventilation: absent    Sedation Level:  Deep  Preoxygenated: Yes    Patient Position:  Sniffing  Final Airway Type:  Endotracheal airway  Final Endotracheal Airway:  ETT  Cuffed: Yes    Technique Used for Successful ETT Placement:  Direct laryngoscopy    Insertion Site:  Oral  Blade Type:  Harrison  Laryngoscope Blade/Videolaryngoscope Blade Size:  2  ETT Size (mm):  8.0  Measured from:  Teeth  ETT to Teeth (cm):  24  Placement Verified by: auscultation and capnometry    Cormack-Lehane Classification:  Grade I - full view of glottis  Number of Attempts at Approach:  1  Ventilation Between Attempts:  None  Number of Other Approaches Attempted:  0

## 2022-05-10 NOTE — ED NOTES
Date Performed: 06/10/2017       Time Performed: 20:52:08

 

PTAGE:      53 years

 

EKG:      Sinus rhythm 

 

 Low QRS voltages in precordial leads Borderline ECG

 

PREVIOUS TRACING       : 04/07/2016 23.08

 

DOCTOR:   Yehuda Farmer  Interpretating Date/Time  06/11/2017 12:19:08 Rounded on PT. PT resting in bed with equal rise and fall of chest. PT denies any needs at this time.    no

## 2022-05-10 NOTE — H&P
CHIEF COMPLAINT: right upper quadrant pain     HISTORY OF PRESENT ILLNESS: The patient is a 57 year-old  man who presents to the Emergency Department a 1- day history of moderate right upper quadrant  abdominal pain. The pain is associated with nausea.  He states he has had similar symptoms 1 time about a year ago.  He has known that he has gallstones for some time.  He denies any food intolerance or temporal relationship between symptoms and eating. He denies a family history of gallstones. The patient denies any recent or intercurrent illness. The patient denies any recent or intercurrent illness. The patient denies any history of previous abdominal surgery. The patient has undergone Bilateral inguinal hernia repairs.    PAST MEDICAL HISTORY:  has a past medical history of Arthritis, Back injury, Backpain, DDD (degenerative disc disease), lumbar, Depression, DJD (degenerative joint disease), Gallstones, GERD (gastroesophageal reflux disease), Pain management contract signed (7/20/2012), Sleep apnea, and Ulcer.    PAST SURGICAL HISTORY:  has a past surgical history that includes hernia rep hiatal; tonsillectomy; knee arthroscopy; ureteroscopy (10/11/2013); cystoscopy stent placement (10/11/2013); lasertripsy (10/11/2013); and gastroscopy-endo (N/A, 11/21/2019).    ALLERGIES: No Known Allergies    CURRENT MEDICATIONS:    Home Medications     Reviewed by Lewis Donovan (Pharmacy Tech) on 05/09/22 at 1638  Med List Status: Partial   Medication Last Dose Status   methadone (DOLOPHINE) 10 MG/ML Conc 5/8/2022 Active   omeprazole (PRILOSEC) 40 MG delayed-release capsule 5/9/2022 Active   tamsulosin (FLOMAX) 0.4 MG capsule 5/8/2022 Active                FAMILY HISTORY: family history is not on file.    SOCIAL HISTORY:  reports that he has been smoking cigarettes. He has a 15.00 pack-year smoking history. He has never used smokeless tobacco. He reports current drug use. He reports that he does not drink  "alcohol.    REVIEW OF SYSTEMS: Comprehensive review of systems is negative with the exception of the aforementioned HPI, PMH, and PSH bullets in accordance with CMS guidelines.    PHYSICAL EXAMINATION:      Constitutional:     Vital Signs: /73   Pulse (!) 59   Temp 36.9 °C (98.5 °F) (Temporal)   Resp 18   Ht 1.753 m (5' 9\")   Wt 91.2 kg (201 lb 1 oz)   SpO2 92%    General Appearance: appears stated age, is in no apparent distress.  HEENT: The pupils are equal, round, and reactive to light bilaterally. The extraocular muscles are intact bilaterally.. The sclera are non icteric. Nares and oropharynx are clear.   Neck: Supple. No adenopathy.  Respiratory:   Inspection: Unlabored respirations, no intercostal retractions, paradoxical motion, or accessory muscle use.   Auscultation: normal.  Cardiovascular:   Inspection: The skin is warm.  Auscultation: Regular rate and rhythm.   Peripheral Pulses: Normal.   Abdomen:  Inspection: Abdominal inspection reveals No abdominal incisions.   Palpation: Palpation is remarkable for mild tenderness in the right upper quadrant  region. No abdominal wall hernias.  Extremities:   Examination of the upper and lower extremities demonstrates no cyanosis edema or clubbing.  Neurologic:   Alert & oriented to person, time and place. Normal motor function. Normal sensory function. No focal deficits noted.    LABORATORY VALUES:   Recent Labs     05/09/22  1235   WBC 16.1*   RBC 5.00   HEMOGLOBIN 15.3   HEMATOCRIT 46.0   MCV 92.0   MCH 30.6   MCHC 33.3*   RDW 49.6   PLATELETCT 247   MPV 10.7     Recent Labs     05/09/22  1235   SODIUM 137   POTASSIUM 4.6   CHLORIDE 101   CO2 27   GLUCOSE 99   BUN 13   CREATININE 0.89   CALCIUM 9.6     Recent Labs     05/09/22  1235   ASTSGOT 29   ALTSGPT 29   TBILIRUBIN 0.3   ALKPHOSPHAT 71   GLOBULIN 3.1            IMAGING:   US-RUQ   Final Result      Cholelithiasis with gallbladder wall thickening that could indicate cholecystitis    "   Extrahepatic biliary ductal dilatation, 11 mm. This may indicate choledocholithiasis that is not visualized favored over ampullary stricture or mass. MRCP may help clarify      Bowel gas technically limits the study somewhat          ASSESSMENT AND PLAN:   57-year-old man with a 1 day history of increasing right upper quadrant pain.  Work-up is consistent with cholecystitis and potentially choledocholithiasis.  A laparoscopic cholecystectomy with cholangiogram is indicated.    The patient has Grade I (mild) acute cholecystitis. Plan: laparoscopic cholecystectomy and Intraoperative cholangiogram.    The patient will be taken to the operating room for laparoscopic cholecystectomy and Intraoperative cholangiogram. The surgical conduct was discussed in detail. Potential complications including, but not limited to, infection, bleeding, damage to adjacent structures, bile duct injury, need to convert to an open procedure, and anesthetic complications were discussed. Operative consent signed.      ____________________________________     Cristi Smith M.D.    DD: 5/9/2022  6:20 PM    [place] Guidelines for Acute Cholecystitis 2018  ACS NSQIP Surgical Risk Calculator

## 2022-05-10 NOTE — CARE PLAN
Problem: Pain - Standard  Goal: Alleviation of pain or a reduction in pain to the patient’s comfort goal  Outcome: Progressing     Problem: Knowledge Deficit - Standard  Goal: Patient and family/care givers will demonstrate understanding of plan of care, disease process/condition, diagnostic tests and medications  Outcome: Progressing   The patient is Stable - Low risk of patient condition declining or worsening    Shift Goals  Clinical Goals: pain control, mobility, oral intake, ambulation,  Patient Goals: discharge, pain control, methadone  Family Goals: NA    Progress made toward(s) clinical / shift goals:  administer pain medications as needed, encourage mobility, encourage oral intake.     Patient is not progressing towards the following goals:

## 2022-06-14 ENCOUNTER — APPOINTMENT (OUTPATIENT)
Dept: RADIOLOGY | Facility: MEDICAL CENTER | Age: 57
End: 2022-06-14
Attending: EMERGENCY MEDICINE
Payer: MEDICAID

## 2022-06-14 ENCOUNTER — HOSPITAL ENCOUNTER (EMERGENCY)
Facility: MEDICAL CENTER | Age: 57
End: 2022-06-14
Attending: EMERGENCY MEDICINE
Payer: MEDICAID

## 2022-06-14 VITALS
WEIGHT: 240 LBS | DIASTOLIC BLOOD PRESSURE: 59 MMHG | HEIGHT: 71 IN | TEMPERATURE: 98.8 F | BODY MASS INDEX: 33.6 KG/M2 | HEART RATE: 71 BPM | OXYGEN SATURATION: 98 % | RESPIRATION RATE: 12 BRPM | SYSTOLIC BLOOD PRESSURE: 108 MMHG

## 2022-06-14 DIAGNOSIS — T30.0 BURN BY HOT LIQUID: ICD-10-CM

## 2022-06-14 DIAGNOSIS — T31.0 BURN (ANY DEGREE) INVOLVING LESS THAN 10% OF BODY SURFACE: ICD-10-CM
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
## 2022-06-14 LAB
ABO + RH BLD: NORMAL
ABO GROUP BLD: NORMAL
ALBUMIN SERPL BCP-MCNC: 4.2 G/DL (ref 3.2–4.9)
ALBUMIN/GLOB SERPL: 1.4 G/DL
ALP SERPL-CCNC: 78 U/L (ref 30–99)
ALT SERPL-CCNC: 42 U/L (ref 2–50)
ANION GAP SERPL CALC-SCNC: 10 MMOL/L (ref 7–16)
APTT PPP: 27.1 SEC (ref 24.7–36)
AST SERPL-CCNC: 29 U/L (ref 12–45)
BILIRUB SERPL-MCNC: 0.3 MG/DL (ref 0.1–1.5)
BLD GP AB SCN SERPL QL: NORMAL
BUN SERPL-MCNC: 13 MG/DL (ref 8–22)
CALCIUM SERPL-MCNC: 9.7 MG/DL (ref 8.5–10.5)
CHLORIDE SERPL-SCNC: 102 MMOL/L (ref 96–112)
CO2 SERPL-SCNC: 27 MMOL/L (ref 20–33)
CREAT SERPL-MCNC: 1.09 MG/DL (ref 0.5–1.4)
ERYTHROCYTE [DISTWIDTH] IN BLOOD BY AUTOMATED COUNT: 50.8 FL (ref 35.9–50)
ETHANOL BLD-MCNC: <10.1 MG/DL
FLUAV RNA SPEC QL NAA+PROBE: NEGATIVE
FLUBV RNA SPEC QL NAA+PROBE: NEGATIVE
GFR SERPLBLD CREATININE-BSD FMLA CKD-EPI: 79 ML/MIN/1.73 M 2
GLOBULIN SER CALC-MCNC: 3.1 G/DL (ref 1.9–3.5)
GLUCOSE SERPL-MCNC: 101 MG/DL (ref 65–99)
HCT VFR BLD AUTO: 43 % (ref 42–52)
HGB BLD-MCNC: 14.1 G/DL (ref 14–18)
INR PPP: 0.96 (ref 0.87–1.13)
MCH RBC QN AUTO: 30 PG (ref 27–33)
MCHC RBC AUTO-ENTMCNC: 32.8 G/DL (ref 33.7–35.3)
MCV RBC AUTO: 91.5 FL (ref 81.4–97.8)
PLATELET # BLD AUTO: 211 K/UL (ref 164–446)
PMV BLD AUTO: 10.5 FL (ref 9–12.9)
POTASSIUM SERPL-SCNC: 4 MMOL/L (ref 3.6–5.5)
PROT SERPL-MCNC: 7.3 G/DL (ref 6–8.2)
PROTHROMBIN TIME: 12.5 SEC (ref 12–14.6)
RBC # BLD AUTO: 4.7 M/UL (ref 4.7–6.1)
RH BLD: NORMAL
RSV RNA SPEC QL NAA+PROBE: NEGATIVE
SARS-COV-2 RNA RESP QL NAA+PROBE: NOTDETECTED
SODIUM SERPL-SCNC: 139 MMOL/L (ref 135–145)
SPECIMEN SOURCE: NORMAL
WBC # BLD AUTO: 10.6 K/UL (ref 4.8–10.8)

## 2022-06-14 PROCEDURE — 700111 HCHG RX REV CODE 636 W/ 250 OVERRIDE (IP): Performed by: SURGERY

## 2022-06-14 PROCEDURE — 0241U HCHG SARS-COV-2 COVID-19 NFCT DS RESP RNA 4 TRGT MIC: CPT

## 2022-06-14 PROCEDURE — 99244 OFF/OP CNSLTJ NEW/EST MOD 40: CPT | Mod: 24 | Performed by: SURGERY

## 2022-06-14 PROCEDURE — 80053 COMPREHEN METABOLIC PANEL: CPT

## 2022-06-14 PROCEDURE — 16025 DRESS/DEBRID P-THICK BURN M: CPT

## 2022-06-14 PROCEDURE — C9803 HOPD COVID-19 SPEC COLLECT: HCPCS | Performed by: EMERGENCY MEDICINE

## 2022-06-14 PROCEDURE — 700111 HCHG RX REV CODE 636 W/ 250 OVERRIDE (IP): Performed by: EMERGENCY MEDICINE

## 2022-06-14 PROCEDURE — 96375 TX/PRO/DX INJ NEW DRUG ADDON: CPT | Mod: XU

## 2022-06-14 PROCEDURE — 86850 RBC ANTIBODY SCREEN: CPT

## 2022-06-14 PROCEDURE — 36415 COLL VENOUS BLD VENIPUNCTURE: CPT

## 2022-06-14 PROCEDURE — 700111 HCHG RX REV CODE 636 W/ 250 OVERRIDE (IP)

## 2022-06-14 PROCEDURE — 86900 BLOOD TYPING SEROLOGIC ABO: CPT

## 2022-06-14 PROCEDURE — 90715 TDAP VACCINE 7 YRS/> IM: CPT | Performed by: SURGERY

## 2022-06-14 PROCEDURE — 96376 TX/PRO/DX INJ SAME DRUG ADON: CPT | Mod: XU

## 2022-06-14 PROCEDURE — 96374 THER/PROPH/DIAG INJ IV PUSH: CPT | Mod: XU

## 2022-06-14 PROCEDURE — 86901 BLOOD TYPING SEROLOGIC RH(D): CPT

## 2022-06-14 PROCEDURE — 99285 EMERGENCY DEPT VISIT HI MDM: CPT

## 2022-06-14 PROCEDURE — 85027 COMPLETE CBC AUTOMATED: CPT

## 2022-06-14 PROCEDURE — 307742 HCHG YELLOW TRAUMA TEAM SERVICES

## 2022-06-14 PROCEDURE — 71045 X-RAY EXAM CHEST 1 VIEW: CPT

## 2022-06-14 PROCEDURE — 85610 PROTHROMBIN TIME: CPT

## 2022-06-14 PROCEDURE — 82077 ASSAY SPEC XCP UR&BREATH IA: CPT

## 2022-06-14 PROCEDURE — 90471 IMMUNIZATION ADMIN: CPT

## 2022-06-14 PROCEDURE — 700105 HCHG RX REV CODE 258: Performed by: SURGERY

## 2022-06-14 PROCEDURE — 85730 THROMBOPLASTIN TIME PARTIAL: CPT

## 2022-06-14 RX ORDER — SODIUM CHLORIDE, SODIUM LACTATE, POTASSIUM CHLORIDE, AND CALCIUM CHLORIDE .6; .31; .03; .02 G/100ML; G/100ML; G/100ML; G/100ML
INJECTION, SOLUTION INTRAVENOUS
Status: COMPLETED | OUTPATIENT
Start: 2022-06-14 | End: 2022-06-14

## 2022-06-14 RX ORDER — SODIUM CHLORIDE, SODIUM LACTATE, POTASSIUM CHLORIDE, CALCIUM CHLORIDE 600; 310; 30; 20 MG/100ML; MG/100ML; MG/100ML; MG/100ML
INJECTION, SOLUTION INTRAVENOUS CONTINUOUS
Status: DISCONTINUED | OUTPATIENT
Start: 2022-06-14 | End: 2022-06-14

## 2022-06-14 RX ORDER — HYDROMORPHONE HYDROCHLORIDE 1 MG/ML
0.5 INJECTION, SOLUTION INTRAMUSCULAR; INTRAVENOUS; SUBCUTANEOUS
Status: DISCONTINUED | OUTPATIENT
Start: 2022-06-14 | End: 2022-06-15 | Stop reason: HOSPADM

## 2022-06-14 RX ADMIN — HYDROMORPHONE HYDROCHLORIDE 0.5 MG: 1 INJECTION, SOLUTION INTRAMUSCULAR; INTRAVENOUS; SUBCUTANEOUS at 23:18

## 2022-06-14 RX ADMIN — SODIUM CHLORIDE, POTASSIUM CHLORIDE, SODIUM LACTATE AND CALCIUM CHLORIDE 1 L: 600; 310; 30; 20 INJECTION, SOLUTION INTRAVENOUS at 19:24

## 2022-06-14 RX ADMIN — FENTANYL CITRATE 50 MCG: 50 INJECTION, SOLUTION INTRAMUSCULAR; INTRAVENOUS at 19:18

## 2022-06-14 RX ADMIN — CLOSTRIDIUM TETANI TOXOID ANTIGEN (FORMALDEHYDE INACTIVATED), CORYNEBACTERIUM DIPHTHERIAE TOXOID ANTIGEN (FORMALDEHYDE INACTIVATED), BORDETELLA PERTUSSIS TOXOID ANTIGEN (GLUTARALDEHYDE INACTIVATED), BORDETELLA PERTUSSIS FILAMENTOUS HEMAGGLUTININ ANTIGEN (FORMALDEHYDE INACTIVATED), BORDETELLA PERTUSSIS PERTACTIN ANTIGEN, AND BORDETELLA PERTUSSIS FIMBRIAE 2/3 ANTIGEN 0.5 ML: 5; 2; 2.5; 5; 3; 5 INJECTION, SUSPENSION INTRAMUSCULAR at 19:20

## 2022-06-14 RX ADMIN — FENTANYL CITRATE 50 MCG: 50 INJECTION, SOLUTION INTRAMUSCULAR; INTRAVENOUS at 19:33

## 2022-06-14 RX ADMIN — HYDROMORPHONE HYDROCHLORIDE 0.5 MG: 1 INJECTION, SOLUTION INTRAMUSCULAR; INTRAVENOUS; SUBCUTANEOUS at 21:08

## 2022-06-14 ASSESSMENT — FIBROSIS 4 INDEX: FIB4 SCORE: 1.24
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
## 2022-06-15 NOTE — DISCHARGE PLANNING
Transfer packets (2) delivered to the [facility]. One copy is for [facility] ER, and one copy is for EMS / [facility].    Pt was unable to sign the COBRA form, however verbal approval was given from pt and he agreed to this transfer and agreed that [facility] and [facility] can share information regarding his care.    Enclosed in the transfer packet envelopes are:    Facesheet x2 copies  COBRA form  Chart Encounter Summary  Imaging Disk (included in the [facility] envelope only).
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
## 2022-06-15 NOTE — ED NOTES
Pt resting in bed. States pain is 5/10 at this time. Denies need for additional pain medication at this time
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
## 2022-11-10 ENCOUNTER — PATIENT MESSAGE (OUTPATIENT)
Dept: HEALTH INFORMATION MANAGEMENT | Facility: OTHER | Age: 57
End: 2022-11-10

## 2023-06-11 NOTE — DISCHARGE INSTRUCTIONS
Post Operative Discharge Instructions:    1. DIET: Upon discharge from the hospital you may resume your normal preoperative diet. Depending on how you are feeling and whether you have nausea or not, you may wish to stay with a bland diet for the first few days. However, you can advance this as quickly as you feel ready.    2. ACTIVITIES: After discharge from the hospital, you may resume full routine activities. However, there should be no heavy lifting (greater than 10 pounds) and no strenuous activities until after your follow-up visit. Otherwise, routine activities of daily living are acceptable.    3. DRIVING: You may drive whenever you are off pain medications and are able to perform the activities needed to drive, i.e. turning, bending, twisting, etc.    4. BATHING: You may get the wound wet at any time after leaving the hospital. You may shower, but do not submerge in a bath for at least two weeks. If you have wound dressings, they may come off after 48 hours. If you have skin glue to the wound, this will fall off on its own, do not pick at it. If you have steri strips to the wound, these will fall off on their own, do not pick at them, may trim the edges if needed.    5. BOWEL FUNCTION: A few patients, after this operation, will develop either frequent or loose stools after meals. This usually corrects itself after a few days, to a few weeks. If this occurs, do not worry; it is not unusual and will resolve. Much more common than loose stools, is constipation. The combination of pain medication and decreased activity level can cause constipation in otherwise normal patients. If you feel this is occurring, take a laxative (Milk of Magnesia, Ex-Lax, Senokot, etc.) until the problem has resolved.    6. PAIN MEDICATION: You will be given a prescription for pain medication at discharge. Please take these as directed. It is important to remember not to take medications on an empty stomach as this may cause  nausea. You may also take over the counter acetaminophen and/or NSAIDS (ibuprofen, Aleve, Advil, Motrin) per the package instructions.     7.CALL IF YOU HAVE: (1) Fevers to more than 101F, (2) Unusual chest or leg pain, (3) Drainage or fluid from incision that may be foul smelling, increased tenderness or soreness at the wound or the wound edges are no longer together, redness or swelling at the incision site. Please do not hesitate to call with any other questions.    Discharge Instructions    Discharged to home by car with relative. Discharged via wheelchair, hospital escort: Yes.  Special equipment needed: Not Applicable    Be sure to schedule a follow-up appointment with your primary care doctor or any specialists as instructed.     Discharge Plan:   Diet Plan: Discussed  Activity Level: Discussed  Confirmed Follow up Appointment: Patient to Call and Schedule Appointment  Confirmed Symptoms Management: Discussed  Medication Reconciliation Updated: Yes    I understand that a diet low in cholesterol, fat, and sodium is recommended for good health. Unless I have been given specific instructions below for another diet, I accept this instruction as my diet prescription.   Other diet: per your surgeons guidelines    Special Instructions: None    · Is patient discharged on Warfarin / Coumadin?   No     Depression / Suicide Risk    As you are discharged from this [facility] facility, it is important to learn how to keep safe from harming yourself.    Recognize the warning signs:  · Abrupt changes in personality, positive or negative- including increase in energy   · Giving away possessions  · Change in eating patterns- significant weight changes-  positive or negative  · Change in sleeping patterns- unable to sleep or sleeping all the time   · Unwillingness or inability to communicate  · Depression  · Unusual sadness, discouragement and loneliness  · Talk of wanting to die  · Neglect of personal  appearance   · Rebelliousness- reckless behavior  · Withdrawal from people/activities they love  · Confusion- inability to concentrate     If you or a loved one observes any of these behaviors or has concerns about self-harm, here's what you can do:  · Talk about it- your feelings and reasons for harming yourself  · Remove any means that you might use to hurt yourself (examples: pills, rope, extension cords, firearm)  · Get professional help from the community (Mental Health, Substance Abuse, psychological counseling)  · Do not be alone:Call your Safe Contact- someone whom you trust who will be there for you.  · Call your local CRISIS HOTLINE 413-3053 or 718-417-9079  · Call your local Children's Mobile Crisis Response Team [place] (499) 427-8178 or www.[employer]  · Call the toll free National Suicide Prevention Hotlines   · National Suicide Prevention Lifeline 687-710-NTOC (5566)  · Linkedwith Line Network 800-SUICIDE (032-4992)      Laparoscopic Cholecystectomy, Care After  This sheet gives you information about how to care for yourself after your procedure. Your doctor may also give you more specific instructions. If you have problems or questions, contact your doctor.  Follow these instructions at home:  Care for cuts from surgery (incisions)    · Follow instructions from your doctor about how to take care of your cuts from surgery. Make sure you:  ? Wash your hands with soap and water before you change your bandage (dressing). If you cannot use soap and water, use hand .  ? Change your bandage as told by your doctor.  ? Leave stitches (sutures), skin glue, or skin tape (adhesive) strips in place. They may need to stay in place for 2 weeks or longer. If tape strips get loose and curl up, you may trim the loose edges. Do not remove tape strips completely unless your doctor says it is okay.  · Do not take baths, swim, or use a hot tub until your doctor says it is okay. Ask your doctor if  you can take showers. You may only be allowed to take sponge baths for bathing.  · Check your surgical cut area every day for signs of infection. Check for:  ? More redness, swelling, or pain.  ? More fluid or blood.  ? Warmth.  ? Pus or a bad smell.  Activity  · Do not drive or use heavy machinery while taking prescription pain medicine.  · Do not lift anything that is heavier than 10 lb (4.5 kg) until your doctor says it is okay.  · Do not play contact sports until your doctor says it is okay.  · Do not drive for 24 hours if you were given a medicine to help you relax (sedative).  · Rest as needed. Do not return to work or school until your doctor says it is okay.  General instructions  · Take over-the-counter and prescription medicines only as told by your doctor.  · To prevent or treat constipation while you are taking prescription pain medicine, your doctor may recommend that you:  ? Drink enough fluid to keep your pee (urine) clear or pale yellow.  ? Take over-the-counter or prescription medicines.  ? Eat foods that are high in fiber, such as fresh fruits and vegetables, whole grains, and beans.  ? Limit foods that are high in fat and processed sugars, such as fried and sweet foods.  Contact a doctor if:  · You develop a rash.  · You have more redness, swelling, or pain around your surgical cuts.  · You have more fluid or blood coming from your surgical cuts.  · Your surgical cuts feel warm to the touch.  · You have pus or a bad smell coming from your surgical cuts.  · You have a fever.  · One or more of your surgical cuts breaks open.  Get help right away if:  · You have trouble breathing.  · You have chest pain.  · You have pain that is getting worse in your shoulders.  · You faint or feel dizzy when you stand.  · You have very bad pain in your belly (abdomen).  · You are sick to your stomach (nauseous) for more than one day.  · You have throwing up (vomiting) that lasts for more than one day.  · You have  leg pain.  This information is not intended to replace advice given to you by your health care provider. Make sure you discuss any questions you have with your health care provider.  Document Released: 09/26/2009 Document Revised: 11/30/2018 Document Reviewed: 06/05/2017  Elsevier Patient Education © 2020 [employer]     Patient

## (undated) DEVICE — BITE BLOCK ADULT 60FR (100EA/CA)

## (undated) DEVICE — SUTURE GENERAL

## (undated) DEVICE — CANISTER SUCTION RIGID RED 1500CC (40EA/CA)

## (undated) DEVICE — TUBING CLEARLINK DUO-VENT - C-FLO (48EA/CA)

## (undated) DEVICE — CHLORAPREP 26 ML APPLICATOR - ORANGE TINT(25/CA)

## (undated) DEVICE — CANISTER SUCTION 3000ML MECHANICAL FILTER AUTO SHUTOFF MEDI-VAC NONSTERILE LF DISP  (40EA/CA)

## (undated) DEVICE — LACTATED RINGERS INJ 1000 ML - (14EA/CA 60CA/PF)

## (undated) DEVICE — SET EXTENSION WITH 2 PORTS (48EA/CA) ***PART #2C8610 IS A SUBSTITUTE*****

## (undated) DEVICE — TROCAR Z THREAD12MM OPTICAL - NON BLADED (6/BX)

## (undated) DEVICE — STOPCOCK 3-WAY W/SWIVEL LEVER LOCK (50EA/CA)

## (undated) DEVICE — CLIP MED LG INTNL HRZN TI ESCP - (20/BX)

## (undated) DEVICE — SYRINGE 30 ML LL (56/BX)

## (undated) DEVICE — TUBE CONNECT SUCTION CLEAR 120 X 1/4" (50EA/CA)"

## (undated) DEVICE — SYRINGE DISP. 50CC LS - (40/BX)

## (undated) DEVICE — GLOVE BIOGEL INDICATOR SZ 6.5 SURGICAL PF LTX - (50PR/BX 4BX/CA)

## (undated) DEVICE — ELECTRODE DUAL RETURN W/ CORD - (50/PK)

## (undated) DEVICE — SLEEVE, VASO, THIGH, MED

## (undated) DEVICE — PROTECTOR ULNA NERVE - (36PR/CA)

## (undated) DEVICE — HEAD HOLDER JUNIOR/ADULT

## (undated) DEVICE — BOVIE BLADE COATED - (50/PK)

## (undated) DEVICE — SUTURE 4-0 MONOCRYL PLUS PS-2 - 27 INCH (36/BX)

## (undated) DEVICE — GOWN SURGEONS LARGE - (32/CA)

## (undated) DEVICE — TROCAR Z THREAD 12 X 100 - BLADED (6/BX)

## (undated) DEVICE — NEPTUNE 4 PORT MANIFOLD - (20/PK)

## (undated) DEVICE — SODIUM CHL IRRIGATION 0.9% 1000ML (12EA/CA)

## (undated) DEVICE — TOWEL STOP TIMEOUT SAFETY FLAG (40EA/CA)

## (undated) DEVICE — DERMABOND ADVANCED - (12EA/BX)

## (undated) DEVICE — ELECTRODE 850 FOAM ADHESIVE - HYDROGEL RADIOTRNSPRNT (50/PK)

## (undated) DEVICE — SITE INJ 7/8IN IV M LL ADPR - (100/CA) THIS IS A CUSTOM ITEM AND HAS A 30 DAY LEAD TIME

## (undated) DEVICE — CATHETER REDDICK ----MUST ORDER IN MULITPLES OF 10----

## (undated) DEVICE — GOWN WARMING STANDARD FLEX - (30/CA)

## (undated) DEVICE — SUTURE 0 COATED VICRYL 6-18IN - (12PK/BX)

## (undated) DEVICE — SET TUBING PNEUMOCLEAR HIGH FLOW SMOKE EVACUATION (10EA/BX)

## (undated) DEVICE — GLOVE BIOGEL SZ 8 SURGICAL PF LTX - (50PR/BX 4BX/CA)

## (undated) DEVICE — TROCAR LAPSCP 100MM 12MM NTHRD - (6/BX)

## (undated) DEVICE — TUBE CONNECTING SUCTION - CLEAR PLASTIC STERILE 72 IN (50EA/CA)

## (undated) DEVICE — TUBING INSUFFLATION PNEUMOCLEAR HIGH-FLOW (10EA/BX)

## (undated) DEVICE — SUCTION INSTRUMENT YANKAUER BULBOUS TIP W/O VENT (50EA/CA)

## (undated) DEVICE — COVER LIGHT HANDLE ALC PLUS DISP (18EA/BX)

## (undated) DEVICE — TUBE SUCTION YANKAUER  1/4 X 6FT (20EA/CA)"

## (undated) DEVICE — GLOVE BIOGEL SZ 6.5 SURGICAL PF LTX (50PR/BX 4BX/CA)

## (undated) DEVICE — CANNULA W/SEAL 5X100 Z-THRE - ADED KII (12/BX)

## (undated) DEVICE — DRAPE 36X28IN RAD CARM BND BG - (25/CA) O

## (undated) DEVICE — PAD PREP 24 X 48 CUFFED - (100/CA)

## (undated) DEVICE — KIT ANESTHESIA W/CIRCUIT & 3/LT BAG W/FILTER (20EA/CA)

## (undated) DEVICE — GLOVE BIOGEL PI INDICATOR SZ 6.5 SURGICAL PF LF - (50/BX 4BX/CA)

## (undated) DEVICE — SET SUCTION/IRRIGATION WITH DISPOSABLE TIP (6/CA )PART #0250-070-520 IS A SUB

## (undated) DEVICE — SET LEADWIRE 5 LEAD BEDSIDE DISPOSABLE ECG (1SET OF 5/EA)

## (undated) DEVICE — SCISSORS 5MM CVD (6EA/BX)

## (undated) DEVICE — BAG RETRIEVAL 10ML (10EA/BX)

## (undated) DEVICE — MASK ANESTHESIA ADULT  - (100/CA)

## (undated) DEVICE — GOWN SURGEONS X-LARGE - DISP. (30/CA)

## (undated) DEVICE — SPONGE GAUZE NON-STERILE 4X4 - (2000/CA 10PK/CA)

## (undated) DEVICE — SUTURE 0 VICRYL PLUS CT-2 - 27 INCH (36/BX)

## (undated) DEVICE — CANNULA W/ SUPPLY TUBING O2 - (50/CA)

## (undated) DEVICE — ADHESIVE DERMABOND HVD MINI (12EA/BX)

## (undated) DEVICE — CATHETER IV SAFETY 20 GA X 1-1/4 (50/BX)

## (undated) DEVICE — SUTURE 4-0 MONOCRYL PLUS PS-1 - 27 INCH (36/BX)

## (undated) DEVICE — BASIN EMESIS DISP. - (250/CA)

## (undated) DEVICE — KIT  I.V. START (100EA/CA)